# Patient Record
Sex: FEMALE | Race: BLACK OR AFRICAN AMERICAN | Employment: FULL TIME
[De-identification: names, ages, dates, MRNs, and addresses within clinical notes are randomized per-mention and may not be internally consistent; named-entity substitution may affect disease eponyms.]

---
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
## 2019-03-01 ENCOUNTER — HOSPITAL ENCOUNTER (OUTPATIENT)
Age: 19
Setting detail: SPECIMEN
Discharge: HOME | End: 2019-03-01
Payer: COMMERCIAL

## 2019-03-01 ENCOUNTER — OFFICE VISIT (OUTPATIENT)
Dept: FAMILY MEDICINE CLINIC | Age: 19
End: 2019-03-01
Payer: COMMERCIAL

## 2019-03-01 VITALS
DIASTOLIC BLOOD PRESSURE: 83 MMHG | WEIGHT: 145 LBS | SYSTOLIC BLOOD PRESSURE: 128 MMHG | BODY MASS INDEX: 25.69 KG/M2 | TEMPERATURE: 97.8 F | HEIGHT: 63 IN

## 2019-03-01 DIAGNOSIS — Z00.121 WELL ADOLESCENT VISIT WITH ABNORMAL FINDINGS: Primary | ICD-10-CM

## 2019-03-01 DIAGNOSIS — K59.04 CHRONIC IDIOPATHIC CONSTIPATION: ICD-10-CM

## 2019-03-01 DIAGNOSIS — E04.9 THYROID GOITER: ICD-10-CM

## 2019-03-01 DIAGNOSIS — Z13.31 POSITIVE DEPRESSION SCREENING: ICD-10-CM

## 2019-03-01 DIAGNOSIS — L70.0 ACNE VULGARIS: ICD-10-CM

## 2019-03-01 LAB
THYROXINE, FREE: 1.33 NG/DL (ref 0.93–1.7)
TSH SERPL DL<=0.05 MIU/L-ACNC: 1.12 MIU/L (ref 0.3–5)

## 2019-03-01 PROCEDURE — 99214 OFFICE O/P EST MOD 30 MIN: CPT | Performed by: PEDIATRICS

## 2019-03-01 PROCEDURE — G8431 POS CLIN DEPRES SCRN F/U DOC: HCPCS | Performed by: PEDIATRICS

## 2019-03-01 PROCEDURE — 99395 PREV VISIT EST AGE 18-39: CPT | Performed by: PEDIATRICS

## 2019-03-01 RX ORDER — FLUOXETINE 10 MG/1
10-20 CAPSULE ORAL DAILY
Qty: 60 CAPSULE | Refills: 0 | Status: SHIPPED | OUTPATIENT
Start: 2019-03-01 | End: 2019-03-31

## 2019-03-01 RX ORDER — TRETINOIN 0.25 MG/G
GEL TOPICAL
Qty: 45 G | Refills: 1 | Status: SHIPPED | OUTPATIENT
Start: 2019-03-01

## 2019-03-01 ASSESSMENT — ENCOUNTER SYMPTOMS
VOMITING: 0
EYES NEGATIVE: 1
RESPIRATORY NEGATIVE: 1
COUGH: 0
ABDOMINAL PAIN: 0
CONSTIPATION: 1
NAUSEA: 0
ROS SKIN COMMENTS: ACNE
ALLERGIC/IMMUNOLOGIC NEGATIVE: 1

## 2019-03-01 ASSESSMENT — PATIENT HEALTH QUESTIONNAIRE - PHQ9
2. FEELING DOWN, DEPRESSED OR HOPELESS: 3
SUM OF ALL RESPONSES TO PHQ9 QUESTIONS 1 & 2: 5
SUM OF ALL RESPONSES TO PHQ QUESTIONS 1-9: 5
SUM OF ALL RESPONSES TO PHQ QUESTIONS 1-9: 5
1. LITTLE INTEREST OR PLEASURE IN DOING THINGS: 2

## 2019-07-11 ENCOUNTER — HOSPITAL ENCOUNTER (EMERGENCY)
Age: 19
Discharge: HOME | End: 2019-07-11
Attending: EMERGENCY MEDICINE
Payer: COMMERCIAL

## 2019-07-11 VITALS
BODY MASS INDEX: 27.6 KG/M2 | SYSTOLIC BLOOD PRESSURE: 113 MMHG | HEIGHT: 62 IN | HEART RATE: 89 BPM | RESPIRATION RATE: 16 BRPM | OXYGEN SATURATION: 100 % | DIASTOLIC BLOOD PRESSURE: 79 MMHG | TEMPERATURE: 97.9 F | WEIGHT: 150 LBS

## 2019-07-11 DIAGNOSIS — K52.9 GASTROENTERITIS: Primary | ICD-10-CM

## 2019-07-11 PROCEDURE — 81025 URINE PREGNANCY TEST: CPT

## 2019-07-11 PROCEDURE — 6370000000 HC RX 637 (ALT 250 FOR IP): Performed by: EMERGENCY MEDICINE

## 2019-07-11 PROCEDURE — 99283 EMERGENCY DEPT VISIT LOW MDM: CPT

## 2019-07-11 PROCEDURE — 81003 URINALYSIS AUTO W/O SCOPE: CPT

## 2019-07-11 RX ORDER — ONDANSETRON 4 MG/1
4 TABLET, ORALLY DISINTEGRATING ORAL EVERY 6 HOURS PRN
Qty: 9 TABLET | Refills: 0 | Status: SHIPPED | OUTPATIENT
Start: 2019-07-11 | End: 2020-08-13

## 2019-07-11 RX ORDER — ONDANSETRON 4 MG/1
4 TABLET, ORALLY DISINTEGRATING ORAL ONCE
Status: COMPLETED | OUTPATIENT
Start: 2019-07-11 | End: 2019-07-11

## 2019-07-11 RX ORDER — DICYCLOMINE HYDROCHLORIDE 10 MG/1
10 CAPSULE ORAL EVERY 6 HOURS PRN
Qty: 9 CAPSULE | Refills: 0 | Status: SHIPPED | OUTPATIENT
Start: 2019-07-11

## 2019-07-11 RX ORDER — DICYCLOMINE HYDROCHLORIDE 10 MG/1
10 CAPSULE ORAL ONCE
Status: COMPLETED | OUTPATIENT
Start: 2019-07-11 | End: 2019-07-11

## 2019-07-11 RX ADMIN — ONDANSETRON 4 MG: 4 TABLET, ORALLY DISINTEGRATING ORAL at 14:25

## 2019-07-11 RX ADMIN — DICYCLOMINE HYDROCHLORIDE 10 MG: 10 CAPSULE ORAL at 14:25

## 2019-07-11 ASSESSMENT — PAIN DESCRIPTION - LOCATION: LOCATION: ABDOMEN

## 2019-07-11 ASSESSMENT — ENCOUNTER SYMPTOMS
BACK PAIN: 0
NAUSEA: 1
DIARRHEA: 1
VOMITING: 1
ABDOMINAL PAIN: 1
SHORTNESS OF BREATH: 0

## 2019-07-11 ASSESSMENT — PAIN DESCRIPTION - ORIENTATION: ORIENTATION: MID;UPPER

## 2019-07-11 ASSESSMENT — PAIN DESCRIPTION - DESCRIPTORS: DESCRIPTORS: CRAMPING

## 2019-07-11 ASSESSMENT — PAIN SCALES - GENERAL: PAINLEVEL_OUTOF10: 6

## 2019-07-11 ASSESSMENT — PAIN DESCRIPTION - PAIN TYPE: TYPE: ACUTE PAIN

## 2019-07-11 NOTE — ED PROVIDER NOTES
She does not appear to require any further emergent evaluation. Patient appears stable for discharge with supportive care at home with antiemetics, antidiarrheals as needed, Bentyl for abdominal cramping. Patient knows to return to the ED if symptoms worsen. Multiple diagnoses were considered during patient's evaluation. Pt doesn't appear to have any emergent process at this time that would require immediate surgical or medical inpatient treatment. Patient is safe for discharge with plan as above. CRITICAL CARE:     Procedures    DIAGNOSTIC RESULTS   EKG:All EKG's are interpreted by the Emergency Department Physician who either signs or Co-signs this chart in the absence of a cardiologist.      RADIOLOGY:All plain film, CT, MRI, and formal ultrasound images (except ED bedside ultrasound) are read by the radiologist, see reports below, unless otherwisenoted in MDM or here. No orders to display     LABS: All lab results were reviewed by myself, and all abnormals are listed below. Labs Reviewed - No data to display  EMERGENCY DEPARTMENTCOURSE:   Vitals:    Vitals:    07/11/19 1353   BP: 113/79   Pulse: 89   Resp: 16   Temp: 97.9 °F (36.6 °C)   TempSrc: Oral   SpO2: 100%   Weight: 150 lb (68 kg)   Height: 5' 2\" (1.575 m)       The patient was given the following medications while in the emergency department:  Orders Placed This Encounter   Medications    ondansetron (ZOFRAN-ODT) disintegrating tablet 4 mg    dicyclomine (BENTYL) capsule 10 mg    ondansetron (ZOFRAN ODT) 4 MG disintegrating tablet     Sig: Take 1 tablet by mouth every 6 hours as needed for Nausea or Vomiting     Dispense:  9 tablet     Refill:  0    dicyclomine (BENTYL) 10 MG capsule     Sig: Take 1 capsule by mouth every 6 hours as needed (cramps)     Dispense:  9 capsule     Refill:  0     CONSULTS:  None    FINAL IMPRESSION      1.  Gastroenteritis          DISPOSITION/PLAN   DISPOSITION Decision To Discharge 07/11/2019 02:13:27 PM      PATIENT REFERRED TO:  Roberto King MD  [address] [place] [address]  [phone]      If symptoms worsen    DISCHARGE MEDICATIONS:  New Prescriptions    DICYCLOMINE (BENTYL) 10 MG CAPSULE    Take 1 capsule by mouth every 6 hours as needed (cramps)    ONDANSETRON (ZOFRAN ODT) 4 MG DISINTEGRATING TABLET    Take 1 tablet by mouth every 6 hours as needed for Nausea or Vomiting     Joan Choi MD  Attending Emergency Physician  Lauren voice recognition software used in portions of this document.                     Joan Choi MD  07/11/19 4314

## 2019-07-11 NOTE — ED NOTES
Pt presents to ed c/o n/v/d since a couple days ago from eating at [employer]. MM pink and moist.  Abdomen soft and non-tender to palpation. Vital signs stable.       Zeynep Dominguez RN  07/11/19 3251

## 2019-07-12 LAB — HCG, PREGNANCY URINE (POC): NEGATIVE

## 2020-08-13 ENCOUNTER — HOSPITAL ENCOUNTER (EMERGENCY)
Age: 20
Discharge: HOME | End: 2020-08-13
Attending: STUDENT IN AN ORGANIZED HEALTH CARE EDUCATION/TRAINING PROGRAM
Payer: COMMERCIAL

## 2020-08-13 ENCOUNTER — APPOINTMENT (OUTPATIENT)
Dept: GENERAL RADIOLOGY | Age: 20
End: 2020-08-13
Payer: COMMERCIAL

## 2020-08-13 VITALS
WEIGHT: 155 LBS | HEIGHT: 62 IN | SYSTOLIC BLOOD PRESSURE: 134 MMHG | OXYGEN SATURATION: 98 % | HEART RATE: 91 BPM | RESPIRATION RATE: 18 BRPM | TEMPERATURE: 98.1 F | BODY MASS INDEX: 28.52 KG/M2 | DIASTOLIC BLOOD PRESSURE: 88 MMHG

## 2020-08-13 PROCEDURE — 99283 EMERGENCY DEPT VISIT LOW MDM: CPT

## 2020-08-13 PROCEDURE — 10060 I&D ABSCESS SIMPLE/SINGLE: CPT

## 2020-08-13 PROCEDURE — 73552 X-RAY EXAM OF FEMUR 2/>: CPT

## 2020-08-13 PROCEDURE — 6370000000 HC RX 637 (ALT 250 FOR IP): Performed by: STUDENT IN AN ORGANIZED HEALTH CARE EDUCATION/TRAINING PROGRAM

## 2020-08-13 RX ORDER — SULFAMETHOXAZOLE AND TRIMETHOPRIM 800; 160 MG/1; MG/1
1 TABLET ORAL EVERY 12 HOURS SCHEDULED
Status: DISCONTINUED | OUTPATIENT
Start: 2020-08-13 | End: 2020-08-13

## 2020-08-13 RX ORDER — CEPHALEXIN 250 MG/1
500 CAPSULE ORAL ONCE
Status: COMPLETED | OUTPATIENT
Start: 2020-08-13 | End: 2020-08-13

## 2020-08-13 RX ORDER — ONDANSETRON 4 MG/1
4 TABLET, ORALLY DISINTEGRATING ORAL ONCE
Status: COMPLETED | OUTPATIENT
Start: 2020-08-13 | End: 2020-08-13

## 2020-08-13 RX ORDER — HYDROCODONE BITARTRATE AND ACETAMINOPHEN 5; 325 MG/1; MG/1
1 TABLET ORAL ONCE
Status: COMPLETED | OUTPATIENT
Start: 2020-08-13 | End: 2020-08-13

## 2020-08-13 RX ORDER — CEPHALEXIN 250 MG/1
250 CAPSULE ORAL 4 TIMES DAILY
Qty: 40 CAPSULE | Refills: 0 | Status: ON HOLD | OUTPATIENT
Start: 2020-08-13 | End: 2020-08-31 | Stop reason: HOSPADM

## 2020-08-13 RX ORDER — FLUCONAZOLE 150 MG/1
150 TABLET ORAL ONCE
Qty: 1 TABLET | Refills: 0 | Status: SHIPPED | OUTPATIENT
Start: 2020-08-13 | End: 2020-08-13

## 2020-08-13 RX ORDER — SULFAMETHOXAZOLE AND TRIMETHOPRIM 800; 160 MG/1; MG/1
1 TABLET ORAL EVERY 12 HOURS SCHEDULED
Status: DISCONTINUED | OUTPATIENT
Start: 2020-08-13 | End: 2020-08-13 | Stop reason: HOSPADM

## 2020-08-13 RX ORDER — SULFAMETHOXAZOLE AND TRIMETHOPRIM 800; 160 MG/1; MG/1
1 TABLET ORAL 2 TIMES DAILY
Qty: 20 TABLET | Refills: 0 | Status: SHIPPED | OUTPATIENT
Start: 2020-08-13 | End: 2020-08-23

## 2020-08-13 RX ORDER — ONDANSETRON 4 MG/1
4 TABLET, ORALLY DISINTEGRATING ORAL EVERY 8 HOURS PRN
Qty: 5 TABLET | Refills: 0 | Status: SHIPPED | OUTPATIENT
Start: 2020-08-13 | End: 2022-09-19

## 2020-08-13 RX ADMIN — CEPHALEXIN 500 MG: 250 CAPSULE ORAL at 17:41

## 2020-08-13 RX ADMIN — HYDROCODONE BITARTRATE AND ACETAMINOPHEN 1 TABLET: 5; 325 TABLET ORAL at 17:41

## 2020-08-13 RX ADMIN — HYDROCODONE BITARTRATE AND ACETAMINOPHEN 1 TABLET: 5; 325 TABLET ORAL at 15:20

## 2020-08-13 RX ADMIN — ONDANSETRON 4 MG: 4 TABLET, ORALLY DISINTEGRATING ORAL at 15:03

## 2020-08-13 RX ADMIN — SULFAMETHOXAZOLE AND TRIMETHOPRIM 1 TABLET: 800; 160 TABLET ORAL at 17:41

## 2020-08-13 ASSESSMENT — ENCOUNTER SYMPTOMS
DIARRHEA: 0
EYE PAIN: 0
SORE THROAT: 0
ABDOMINAL PAIN: 0
CONSTIPATION: 0
SHORTNESS OF BREATH: 0
RHINORRHEA: 0
COUGH: 0

## 2020-08-13 ASSESSMENT — PAIN DESCRIPTION - ORIENTATION: ORIENTATION: LEFT

## 2020-08-13 ASSESSMENT — PAIN SCALES - GENERAL
PAINLEVEL_OUTOF10: 9

## 2020-08-13 ASSESSMENT — PAIN DESCRIPTION - LOCATION: LOCATION: LEG

## 2020-08-13 ASSESSMENT — PAIN DESCRIPTION - DESCRIPTORS: DESCRIPTORS: BURNING

## 2020-08-13 ASSESSMENT — PAIN DESCRIPTION - PROGRESSION: CLINICAL_PROGRESSION: NOT CHANGED

## 2020-08-13 ASSESSMENT — PAIN DESCRIPTION - PAIN TYPE: TYPE: ACUTE PAIN

## 2020-08-13 ASSESSMENT — PAIN DESCRIPTION - FREQUENCY: FREQUENCY: CONTINUOUS

## 2020-08-13 NOTE — ED NOTES
Pt medicated per order, denies needs or questions at this time      Tanner Jurist, RN  08/13/20 4307

## 2020-08-13 NOTE — ED PROVIDER NOTES
[facility] ED  Emergency Department Encounter  Emergency Medicine Resident     Pt Name: Xi Hensley  MRN: [id number]  [place] 2000  Date of evaluation: 8/13/20  PCP:  Martine Mcelroy MD    CHIEF COMPLAINT       Chief Complaint   Patient presents with    Abscess     Pt states abscess on Lt thigh going on for about x1 week. HISTORY OFPRESENT ILLNESS  (Location/Symptom, Timing/Onset, Context/Setting, Quality, Duration, Modifying Factors,Severity.)      Xi Hensley is a 20 y.o. female with no significant past medical history who presents with acute worsening of states that she noticed mid last week. Patient went to outside hospital on Monday and received clindamycin which she has been attempting to take but has not been able to keep down due to vomiting after taking the antibiotic. She states that she has been able to keep a few of them down. Patient noticed that the abscess was starting to drain blood on the 10th and noticed that the swelling was increasing and starting to spread up her anterior thigh. Patient states that the abscess and the surrounding swelling is tender a burning type pain. Patient states that this pain is 9 out of 10 in severity but 10 out of 10 when she walks. Walking, movement, and touching the area makes the pain worse. Pain is radiating up the anterior and medial thigh. Patient stating that she has felt lightheaded has had associated nausea, vomiting. PAST MEDICAL / SURGICAL / SOCIAL / FAMILY HISTORY      has no past medical history on file. has no past surgical history on file. Social:  reports that she is a non-smoker but has been exposed to tobacco smoke. She has never used smokeless tobacco. She reports that she does not drink alcohol or use drugs.     Family Hx:   Family History   Problem Relation Age of Onset    Asthma Brother     Heart Disease Maternal Grandmother     High Blood Pressure Maternal Grandmother     Cancer Maternal Grandfather     High Blood Pressure Maternal Grandfather     High Cholesterol Maternal Grandfather     Learning Disabilities Maternal Grandfather     Stroke Maternal Grandfather     Substance Abuse Maternal Grandfather     Learning Disabilities Maternal Aunt     Learning Disabilities Maternal Uncle     Substance Abuse Maternal Uncle     Arthritis Neg Hx     Birth Defects Neg Hx     Depression Neg Hx     Diabetes Neg Hx     Early Death Neg Hx     Hearing Loss Neg Hx     Kidney Disease Neg Hx     Mental Illness Neg Hx     Mental Retardation Neg Hx     Miscarriages / Stillbirths Neg Hx         Allergies:  Patient has no known allergies. Home Medications:  Prior to Admission medications    Medication Sig Start Date End Date Taking? Authorizing Provider   cephALEXin (KEFLEX) 250 MG capsule Take 1 capsule by mouth 4 times daily 8/13/20  Yes Lesli Vincent DO   sulfamethoxazole-trimethoprim (BACTRIM DS) 800-160 MG per tablet Take 1 tablet by mouth 2 times daily for 10 days 8/13/20 8/23/20 Yes Lesli Vincent DO   fluconazole (DIFLUCAN) 150 MG tablet Take 1 tablet by mouth once for 1 dose 8/13/20 8/13/20 Yes Lesli Vincent DO   ondansetron (ZOFRAN ODT) 4 MG disintegrating tablet Place 1 tablet under the tongue every 8 hours as needed for Nausea 8/13/20  Yes Lesli Vincent DO   dicyclomine (BENTYL) 10 MG capsule Take 1 capsule by mouth every 6 hours as needed (cramps) 7/11/19   Petra Lynch MD   FLUoxetine (PROZAC) 10 MG capsule Take 1-2 capsules by mouth daily 3/1/19 3/31/19  Alejandra Parra MD   tretinoin (RETIN-A) 0.025 % gel Apply topically nightly. 3/1/19   Alejandra Parra MD   benzoyl peroxide [facility] [address]) 5 % external liquid Apply topically 2 times daily.  2/16/18   Alejandra Parra MD   clindamycin (CLINDAGEL) 1 % gel Twice daily 2/16/18   Alejandra Parra MD   medroxyPROGESTERone (DEPO-PROVERA) 150 MG/ML injection Inject 1 mL into the muscle once for 1 dose 10/9/17 10/9/17  Jaquan Gee MD   ferrous sulfate (FE TABS) 325 (65 FE) MG EC tablet Take 1 tablet by mouth 2 times daily 1/20/17 2/19/17  Sherman Diehl MD       REVIEW OFSYSTEMS    (2-9 systems for level 4, 10 or more for level 5)      Review of Systems   Constitutional: Negative for activity change, chills and fever. HENT: Negative for congestion, rhinorrhea and sore throat. Eyes: Negative for pain and visual disturbance. Respiratory: Negative for cough and shortness of breath. Cardiovascular: Negative for chest pain and palpitations. Gastrointestinal: Negative for abdominal pain, constipation and diarrhea. Genitourinary: Negative for difficulty urinating and frequency. Musculoskeletal: Negative for arthralgias and myalgias. Skin: Negative for rash. Abcess of left mid thigh   Neurological: Positive for light-headedness. Negative for dizziness and headaches. PHYSICAL EXAM   (up to 7 for level 4, 8 or more forlevel 5)      INITIAL VITALS:   Vitals:    08/13/20 1416   BP: 134/88   Pulse: 91   Resp: 18   Temp:    SpO2: 98%        Physical Exam  Vitals signs and nursing note reviewed. Constitutional:       General: She is not in acute distress. Appearance: Normal appearance. She is not ill-appearing. HENT:      Head: Normocephalic and atraumatic. Nose: Nose normal.      Mouth/Throat:      Mouth: Mucous membranes are moist.      Pharynx: Oropharynx is clear. Eyes:      General:         Right eye: No discharge. Left eye: No discharge. Cardiovascular:      Rate and Rhythm: Normal rate and regular rhythm. Heart sounds: No murmur. No friction rub. No gallop. Pulmonary:      Effort: Pulmonary effort is normal. No respiratory distress. Breath sounds: Normal breath sounds. Abdominal:      General: There is no distension. Palpations: Abdomen is soft. Tenderness: There is no abdominal tenderness. Skin:     General: Skin is warm and dry. examination of vaginal area, perineum, and groin. [MA]   1506 No vaginal involvement noted on physical exam. After discussion of extensive involvement will order Xray to determine depth and extent. R/o necrotizing fasciitis    [MA]   65 Patient updated that we will hold off on antibiotics until imaging is obtained. Awaiting Xray. [MA]   1643 Negative xray. No gas noted, no bone involvement noted. XR FEMUR LEFT (MIN 2 VIEWS) [MA]   1717 Irrigation and drainage done bedside. Copious amount of purulence drained until patient could not tolerate. [MA]   1913 Discussed importance of full course of antibiotics. Other educated on return precautions. They are compliant and understanding of return precautions. Instructed to return to the emergency department for a wound check in 2 to 3 days. Discussed to expect increase in drainage of the area and that this is an expected result of the antibiotics. Discussed to stop taking Clindamycin and take the provided Keflex and Bactrim. Patient and mother compliant with plan for discharge and understanding of discharge instructions. [MA]      ED Course User Index  [MA] Cathleen Pepper DO          PROCEDURES:  Incision/Drainage    Date/Time: 8/13/2020 7:16 PM  Performed by: Cathleen Pepper DO  Authorized by: Demi Aparicio DO     Consent:     Consent obtained:  Verbal    Consent given by:  Patient    Risks discussed:  Bleeding, incomplete drainage and pain  Location:     Type:  Abscess    Location:  Lower extremity    Lower extremity location:  Leg    Leg location:  L upper leg  Anesthesia (see MAR for exact dosages):      Anesthesia method:  None  Procedure type:     Complexity:  Complex  Procedure details:     Needle aspiration: no      Wound management:  Irrigated with saline and extensive cleaning    Drainage:  Bloody and purulent    Drainage amount:  Copious    Wound treatment:  Wound left open    Packing materials:  None  Post-procedure details: Patient tolerance of procedure: Tolerated well, no immediate complications        CONSULTS:  None      FINAL IMPRESSION      1. Cellulitis and abscess of leg          DISPOSITION / PLAN     DISPOSITION      Charge to home with written prescription for Keflex, Bactrim, Zofran, and fluconazole. And mother at bedside are both compliant and understanding of plan for discharge home. Understanding to return for wound recheck in 2 to 3 days. Natividaderin Cherri!!! On behalf of the Emergency Department staff at [facility]. [facility] Emergency Department, I would like to thank you for giving Ayesha Lee the opportunity to address your health care needs and concerns. We hope that during your visit, our service was delivered in a professional and caring manner. Please keep Ayesha Lee in mind as we walk with you down the path to your own personal wellness. Please expect an automated phone call from [phone] so we can ask a few questions about your health and progress. Based on your answers, a clinician may call you back to offer help and instructions. If you notice any concerning symptoms please return to the ER immediately. These can include but are not limited to: fevers, chills, shortness of breath, vomiting, weakness of the extremities, changes in your mental status, numbness, pale extremities, or chest pain. SUMMARY OF YOUR VISIT      Imaging: 3 of left leg showing no acute abnormalities. Follow up: To the primary care provider, emergency department, or walk-in clinic for wound recheck in 2 to 3 days.       PATIENT REFERRED TO:  [facility] ED  [address] [facility] [address]  [phone]    For wound re-check in 2-3 days      DISCHARGE MEDICATIONS:  Discharge Medication List as of 8/13/2020  5:30 PM      START taking these medications    Details   cephALEXin (KEFLEX) 250 MG capsule Take 1 capsule by mouth 4 times daily, Disp-40 capsule,R-0Print sulfamethoxazole-trimethoprim (BACTRIM DS) 800-160 MG per tablet Take 1 tablet by mouth 2 times daily for 10 days, Disp-20 tablet,R-0Print      fluconazole (DIFLUCAN) 150 MG tablet Take 1 tablet by mouth once for 1 dose, Disp-1 tablet,R-0Print             Shane Ibanez DO  Emergency Medicine Resident    (Please note that portions of this note were completed with a voice recognition program.Efforts were made to edit the dictations but occasionally words are mis-transcribed.)       Shane Ibanez DO  Resident  08/13/20 2900

## 2020-08-13 NOTE — ED NOTES
Pt reports to the ED via triage with c/o abscess. Pt states for about a week she has an abcess on the Lt thigh that keeps getting bigger, pt states she was at [facility] and they gave her antibiotics which caused her to vomit so she stopped taking them, pt sattes she came in today due to the leg getting more swollen, PMS intact of Lt leg. Pt is A&Ox4, RR even and non labored.       Lacy Briones RN  08/13/20 3745

## 2020-08-13 NOTE — ED PROVIDER NOTES
[facility]     Emergency Department     Faculty Note/ Attestation      Pt Name: Jade Benavides                                       MRN: [id number]  [place] 2000  Date of evaluation: 8/13/2020  Patients PCP:    Fredy Mejia MD    Attestation  I performed a history and physical examination of the patient/ or directly observed  and discussed management with the resident. I reviewed the residents note and agree with the documented findings and plan of care. Any areas of disagreement are noted on the chart. I was personally present for the key portions of any procedures. I have documented in the chart those procedures where I was not present during the key portions. I have reviewed the emergency nurses triage note. I agree with the chief complaint, past medical history, past surgical history, allergies, medications, social and family history as documented unless otherwise noted below. For Physician Assistant/ Nurse Practitioner cases/documentation I have personally evaluated this patient and have completed at least one if not all key elements of the E/M (history, physical exam, and MDM). Additional findings are as noted. Initial Screens:             Vitals:    Vitals:    08/13/20 1404 08/13/20 1416   BP:  134/88   Pulse:  91   Resp:  18   Temp: 98.1 °F (36.7 °C)    TempSrc: Oral    SpO2:  98%   Weight:  155 lb (70.3 kg)   Height:  5' 2\" (1.575 m)       Chief Complaint      Chief Complaint   Patient presents with    Abscess     Pt states abscess on Lt thigh going on for about x1 week. height is 5' 2\" (1.575 m) and weight is 155 lb (70.3 kg). Her oral temperature is 98.1 °F (36.7 °C). Her blood pressure is 134/88 and her pulse is 91. Her respiration is 18 and oxygen saturation is 98%.             DIAGNOSTIC RESULTS       RADIOLOGY:   No orders to display         LABS:  Labs Reviewed - No data to display      EMERGENCY DEPARTMENT COURSE: -------------------------    BP: 134/88, Temp: 98.1 °F (36.7 °C), Pulse: 91, Resp: 18    System Problem List     There is no problem list on file for this patient.     Comments  Chronic Prob List noted    Abscess to LLE, seen at [facility] on Monday and given clindamycin however unable to keep it down  Swelling increased, up to anterior medial thigh  spontaneously draining mostly blood    On evaluation very large area of induration and tenderness to medial aspect of thigh  Large approx 4cm open area draining blood with small amount of purulence    Will xray, irrigate, plan to d/c w/ keflex, bactrim, close f/u    Kelly Yanez DO  Attending Emergency Physician      Kelly Yanez,   08/13/20 [place]  08/13/20 9489

## 2020-08-13 NOTE — ED NOTES
Report received from [facility]. This caregiver met patient, resting quietly, no new change in status.   Medicated for pain to left thigh  Pain 9/10  Siderails up x2  Covered with blankets  Mom at bedside         Lawrence Langley RN  08/13/20 4641

## 2020-08-28 ENCOUNTER — APPOINTMENT (OUTPATIENT)
Dept: GENERAL RADIOLOGY | Age: 20
DRG: 581 | End: 2020-08-28
Payer: COMMERCIAL

## 2020-08-28 ENCOUNTER — HOSPITAL ENCOUNTER (INPATIENT)
Age: 20
LOS: 3 days | Discharge: HOME | DRG: 581 | End: 2020-08-31
Attending: EMERGENCY MEDICINE | Admitting: INTERNAL MEDICINE
Payer: COMMERCIAL

## 2020-08-28 PROBLEM — F12.10 CANNABIS ABUSE WITHOUT COMPLICATION: Status: ACTIVE | Noted: 2020-08-28

## 2020-08-28 PROBLEM — L02.419 ABSCESS OF THIGH: Status: ACTIVE | Noted: 2020-08-28

## 2020-08-28 PROBLEM — D75.838 REACTIVE THROMBOCYTOSIS: Status: ACTIVE | Noted: 2020-08-28

## 2020-08-28 PROBLEM — R79.82 CRP ELEVATED: Status: ACTIVE | Noted: 2020-08-28

## 2020-08-28 PROBLEM — L03.119 RECURRENT CELLULITIS OF THIGH: Status: ACTIVE | Noted: 2020-08-28

## 2020-08-28 LAB
ABSOLUTE EOS #: 0.3 K/UL (ref 0–0.44)
ABSOLUTE IMMATURE GRANULOCYTE: 0.06 K/UL (ref 0–0.3)
ABSOLUTE LYMPH #: 1.98 K/UL (ref 1.2–5.2)
ABSOLUTE MONO #: 0.63 K/UL (ref 0.1–1.4)
ANION GAP SERPL CALCULATED.3IONS-SCNC: 12 MMOL/L (ref 9–17)
BASOPHILS # BLD: 1 % (ref 0–2)
BASOPHILS ABSOLUTE: 0.07 K/UL (ref 0–0.2)
BUN BLDV-MCNC: 10 MG/DL (ref 6–20)
BUN/CREAT BLD: ABNORMAL (ref 9–20)
C-REACTIVE PROTEIN: 35.4 MG/L (ref 0–5)
CALCIUM SERPL-MCNC: 9.2 MG/DL (ref 8.6–10.4)
CHLORIDE BLD-SCNC: 102 MMOL/L (ref 98–107)
CO2: 23 MMOL/L (ref 20–31)
CREAT SERPL-MCNC: 0.41 MG/DL (ref 0.5–0.9)
DIFFERENTIAL TYPE: ABNORMAL
EOSINOPHILS RELATIVE PERCENT: 3 % (ref 1–4)
GFR AFRICAN AMERICAN: >60 ML/MIN
GFR NON-AFRICAN AMERICAN: >60 ML/MIN
GFR SERPL CREATININE-BSD FRML MDRD: ABNORMAL ML/MIN/{1.73_M2}
GFR SERPL CREATININE-BSD FRML MDRD: ABNORMAL ML/MIN/{1.73_M2}
GLUCOSE BLD-MCNC: 95 MG/DL (ref 70–99)
HCG QUALITATIVE: NEGATIVE
HCT VFR BLD CALC: 37.2 % (ref 36.3–47.1)
HEMOGLOBIN: 12 G/DL (ref 11.9–15.1)
HEPATITIS C ANTIBODY: NONREACTIVE
HIV AG/AB: NONREACTIVE
IMMATURE GRANULOCYTES: 1 %
LYMPHOCYTES # BLD: 16 % (ref 25–45)
MCH RBC QN AUTO: 27.9 PG (ref 25.2–33.5)
MCHC RBC AUTO-ENTMCNC: 32.3 G/DL (ref 28.4–34.8)
MCV RBC AUTO: 86.5 FL (ref 82.6–102.9)
MONOCYTES # BLD: 5 % (ref 2–8)
NRBC AUTOMATED: 0 PER 100 WBC
PDW BLD-RTO: 15.6 % (ref 11.8–14.4)
PLATELET # BLD: 593 K/UL (ref 138–453)
PLATELET ESTIMATE: ABNORMAL
PMV BLD AUTO: 9.6 FL (ref 8.1–13.5)
POTASSIUM SERPL-SCNC: 4.5 MMOL/L (ref 3.7–5.3)
RBC # BLD: 4.3 M/UL (ref 3.95–5.11)
RBC # BLD: ABNORMAL 10*6/UL
SEG NEUTROPHILS: 74 % (ref 34–64)
SEGMENTED NEUTROPHILS ABSOLUTE COUNT: 9.13 K/UL (ref 1.8–8)
SODIUM BLD-SCNC: 137 MMOL/L (ref 135–144)
WBC # BLD: 12.2 K/UL (ref 4.5–13.5)
WBC # BLD: ABNORMAL 10*3/UL

## 2020-08-28 PROCEDURE — 6360000002 HC RX W HCPCS: Performed by: NURSE PRACTITIONER

## 2020-08-28 PROCEDURE — 90715 TDAP VACCINE 7 YRS/> IM: CPT | Performed by: EMERGENCY MEDICINE

## 2020-08-28 PROCEDURE — 87040 BLOOD CULTURE FOR BACTERIA: CPT

## 2020-08-28 PROCEDURE — 86803 HEPATITIS C AB TEST: CPT

## 2020-08-28 PROCEDURE — 84703 CHORIONIC GONADOTROPIN ASSAY: CPT

## 2020-08-28 PROCEDURE — 87186 SC STD MICRODIL/AGAR DIL: CPT

## 2020-08-28 PROCEDURE — 6360000002 HC RX W HCPCS: Performed by: INTERNAL MEDICINE

## 2020-08-28 PROCEDURE — 86140 C-REACTIVE PROTEIN: CPT

## 2020-08-28 PROCEDURE — 96367 TX/PROPH/DG ADDL SEQ IV INF: CPT

## 2020-08-28 PROCEDURE — 6360000002 HC RX W HCPCS: Performed by: EMERGENCY MEDICINE

## 2020-08-28 PROCEDURE — 87389 HIV-1 AG W/HIV-1&-2 AB AG IA: CPT

## 2020-08-28 PROCEDURE — 86403 PARTICLE AGGLUT ANTBDY SCRN: CPT

## 2020-08-28 PROCEDURE — 87205 SMEAR GRAM STAIN: CPT

## 2020-08-28 PROCEDURE — 1200000000 HC SEMI PRIVATE

## 2020-08-28 PROCEDURE — 6370000000 HC RX 637 (ALT 250 FOR IP): Performed by: EMERGENCY MEDICINE

## 2020-08-28 PROCEDURE — 0J9M0ZZ DRAINAGE OF LEFT UPPER LEG SUBCUTANEOUS TISSUE AND FASCIA, OPEN APPROACH: ICD-10-PCS | Performed by: EMERGENCY MEDICINE

## 2020-08-28 PROCEDURE — 2580000003 HC RX 258: Performed by: NURSE PRACTITIONER

## 2020-08-28 PROCEDURE — 90471 IMMUNIZATION ADMIN: CPT | Performed by: EMERGENCY MEDICINE

## 2020-08-28 PROCEDURE — 99223 1ST HOSP IP/OBS HIGH 75: CPT | Performed by: INTERNAL MEDICINE

## 2020-08-28 PROCEDURE — 80048 BASIC METABOLIC PNL TOTAL CA: CPT

## 2020-08-28 PROCEDURE — 73552 X-RAY EXAM OF FEMUR 2/>: CPT

## 2020-08-28 PROCEDURE — 96365 THER/PROPH/DIAG IV INF INIT: CPT

## 2020-08-28 PROCEDURE — 10061 I&D ABSCESS COMP/MULTIPLE: CPT

## 2020-08-28 PROCEDURE — 96372 THER/PROPH/DIAG INJ SC/IM: CPT

## 2020-08-28 PROCEDURE — 87075 CULTR BACTERIA EXCEPT BLOOD: CPT

## 2020-08-28 PROCEDURE — 6370000000 HC RX 637 (ALT 250 FOR IP): Performed by: INTERNAL MEDICINE

## 2020-08-28 PROCEDURE — 99284 EMERGENCY DEPT VISIT MOD MDM: CPT

## 2020-08-28 PROCEDURE — 85025 COMPLETE CBC W/AUTO DIFF WBC: CPT

## 2020-08-28 PROCEDURE — 87070 CULTURE OTHR SPECIMN AEROBIC: CPT

## 2020-08-28 PROCEDURE — 99254 IP/OBS CNSLTJ NEW/EST MOD 60: CPT | Performed by: INTERNAL MEDICINE

## 2020-08-28 PROCEDURE — 96375 TX/PRO/DX INJ NEW DRUG ADDON: CPT

## 2020-08-28 RX ORDER — DIPHENHYDRAMINE HYDROCHLORIDE 50 MG/ML
25 INJECTION INTRAMUSCULAR; INTRAVENOUS ONCE
Status: COMPLETED | OUTPATIENT
Start: 2020-08-28 | End: 2020-08-28

## 2020-08-28 RX ORDER — ACETAMINOPHEN 650 MG/1
650 SUPPOSITORY RECTAL EVERY 6 HOURS PRN
Status: DISCONTINUED | OUTPATIENT
Start: 2020-08-28 | End: 2020-08-28

## 2020-08-28 RX ORDER — POLYETHYLENE GLYCOL 3350 17 G/17G
17 POWDER, FOR SOLUTION ORAL DAILY PRN
Status: DISCONTINUED | OUTPATIENT
Start: 2020-08-28 | End: 2020-08-31 | Stop reason: HOSPADM

## 2020-08-28 RX ORDER — VANCOMYCIN HYDROCHLORIDE 1 G/200ML
15 INJECTION, SOLUTION INTRAVENOUS ONCE
Status: COMPLETED | OUTPATIENT
Start: 2020-08-28 | End: 2020-08-28

## 2020-08-28 RX ORDER — VANCOMYCIN HYDROCHLORIDE 1 G/200ML
1000 INJECTION, SOLUTION INTRAVENOUS EVERY 8 HOURS
Status: DISCONTINUED | OUTPATIENT
Start: 2020-08-28 | End: 2020-08-31 | Stop reason: HOSPADM

## 2020-08-28 RX ORDER — MORPHINE SULFATE 4 MG/ML
2 INJECTION, SOLUTION INTRAMUSCULAR; INTRAVENOUS ONCE
Status: COMPLETED | OUTPATIENT
Start: 2020-08-28 | End: 2020-08-28

## 2020-08-28 RX ORDER — KETOROLAC TROMETHAMINE 30 MG/ML
30 INJECTION, SOLUTION INTRAMUSCULAR; INTRAVENOUS ONCE
Status: COMPLETED | OUTPATIENT
Start: 2020-08-28 | End: 2020-08-28

## 2020-08-28 RX ORDER — SODIUM CHLORIDE 0.9 % (FLUSH) 0.9 %
10 SYRINGE (ML) INJECTION EVERY 12 HOURS SCHEDULED
Status: DISCONTINUED | OUTPATIENT
Start: 2020-08-28 | End: 2020-08-31 | Stop reason: HOSPADM

## 2020-08-28 RX ORDER — ACETAMINOPHEN 500 MG
1000 TABLET ORAL ONCE
Status: COMPLETED | OUTPATIENT
Start: 2020-08-28 | End: 2020-08-28

## 2020-08-28 RX ORDER — ONDANSETRON 2 MG/ML
4 INJECTION INTRAMUSCULAR; INTRAVENOUS EVERY 6 HOURS PRN
Status: DISCONTINUED | OUTPATIENT
Start: 2020-08-28 | End: 2020-08-31 | Stop reason: HOSPADM

## 2020-08-28 RX ORDER — LIDOCAINE 40 MG/G
CREAM TOPICAL ONCE
Status: COMPLETED | OUTPATIENT
Start: 2020-08-28 | End: 2020-08-28

## 2020-08-28 RX ORDER — SODIUM CHLORIDE 0.9 % (FLUSH) 0.9 %
10 SYRINGE (ML) INJECTION PRN
Status: DISCONTINUED | OUTPATIENT
Start: 2020-08-28 | End: 2020-08-31 | Stop reason: HOSPADM

## 2020-08-28 RX ORDER — NICOTINE 21 MG/24HR
1 PATCH, TRANSDERMAL 24 HOURS TRANSDERMAL DAILY PRN
Status: DISCONTINUED | OUTPATIENT
Start: 2020-08-28 | End: 2020-08-28

## 2020-08-28 RX ORDER — FENTANYL CITRATE 50 UG/ML
50 INJECTION, SOLUTION INTRAMUSCULAR; INTRAVENOUS ONCE
Status: COMPLETED | OUTPATIENT
Start: 2020-08-28 | End: 2020-08-28

## 2020-08-28 RX ORDER — DIPHENHYDRAMINE HYDROCHLORIDE 50 MG/ML
25 INJECTION INTRAMUSCULAR; INTRAVENOUS EVERY 6 HOURS PRN
Status: DISCONTINUED | OUTPATIENT
Start: 2020-08-28 | End: 2020-08-31 | Stop reason: HOSPADM

## 2020-08-28 RX ORDER — MAGNESIUM SULFATE 1 G/100ML
1 INJECTION INTRAVENOUS PRN
Status: DISCONTINUED | OUTPATIENT
Start: 2020-08-28 | End: 2020-08-31 | Stop reason: HOSPADM

## 2020-08-28 RX ORDER — ACETAMINOPHEN 325 MG/1
650 TABLET ORAL EVERY 6 HOURS PRN
Status: DISCONTINUED | OUTPATIENT
Start: 2020-08-28 | End: 2020-08-31 | Stop reason: HOSPADM

## 2020-08-28 RX ORDER — OXYCODONE HYDROCHLORIDE AND ACETAMINOPHEN 5; 325 MG/1; MG/1
1 TABLET ORAL EVERY 4 HOURS PRN
Status: DISCONTINUED | OUTPATIENT
Start: 2020-08-28 | End: 2020-08-31 | Stop reason: HOSPADM

## 2020-08-28 RX ORDER — PROMETHAZINE HYDROCHLORIDE 25 MG/1
12.5 TABLET ORAL EVERY 6 HOURS PRN
Status: DISCONTINUED | OUTPATIENT
Start: 2020-08-28 | End: 2020-08-31 | Stop reason: HOSPADM

## 2020-08-28 RX ORDER — POTASSIUM CHLORIDE 7.45 MG/ML
10 INJECTION INTRAVENOUS PRN
Status: DISCONTINUED | OUTPATIENT
Start: 2020-08-28 | End: 2020-08-31 | Stop reason: HOSPADM

## 2020-08-28 RX ORDER — SODIUM CHLORIDE 9 MG/ML
INJECTION, SOLUTION INTRAVENOUS CONTINUOUS
Status: DISCONTINUED | OUTPATIENT
Start: 2020-08-28 | End: 2020-08-31 | Stop reason: HOSPADM

## 2020-08-28 RX ORDER — KETOROLAC TROMETHAMINE 30 MG/ML
30 INJECTION, SOLUTION INTRAMUSCULAR; INTRAVENOUS EVERY 6 HOURS PRN
Status: DISCONTINUED | OUTPATIENT
Start: 2020-08-28 | End: 2020-08-29

## 2020-08-28 RX ORDER — POTASSIUM CHLORIDE 20 MEQ/1
40 TABLET, EXTENDED RELEASE ORAL PRN
Status: DISCONTINUED | OUTPATIENT
Start: 2020-08-28 | End: 2020-08-31 | Stop reason: HOSPADM

## 2020-08-28 RX ADMIN — KETOROLAC TROMETHAMINE 30 MG: 30 INJECTION, SOLUTION INTRAMUSCULAR at 09:28

## 2020-08-28 RX ADMIN — DIPHENHYDRAMINE HYDROCHLORIDE 25 MG: 50 INJECTION, SOLUTION INTRAMUSCULAR; INTRAVENOUS at 21:36

## 2020-08-28 RX ADMIN — VANCOMYCIN HYDROCHLORIDE 1000 MG: 1 INJECTION, SOLUTION INTRAVENOUS at 17:48

## 2020-08-28 RX ADMIN — FENTANYL CITRATE 50 MCG: 50 INJECTION, SOLUTION INTRAMUSCULAR; INTRAVENOUS at 08:28

## 2020-08-28 RX ADMIN — OXYCODONE HYDROCHLORIDE AND ACETAMINOPHEN 1 TABLET: 5; 325 TABLET ORAL at 16:08

## 2020-08-28 RX ADMIN — OXYCODONE HYDROCHLORIDE AND ACETAMINOPHEN 1 TABLET: 5; 325 TABLET ORAL at 21:37

## 2020-08-28 RX ADMIN — TETANUS TOXOID, REDUCED DIPHTHERIA TOXOID AND ACELLULAR PERTUSSIS VACCINE, ADSORBED 0.5 ML: 5; 2.5; 8; 8; 2.5 SUSPENSION INTRAMUSCULAR at 07:57

## 2020-08-28 RX ADMIN — ENOXAPARIN SODIUM 40 MG: 40 INJECTION SUBCUTANEOUS at 14:58

## 2020-08-28 RX ADMIN — MORPHINE SULFATE 2 MG: 4 INJECTION INTRAVENOUS at 07:57

## 2020-08-28 RX ADMIN — VANCOMYCIN HYDROCHLORIDE 1000 MG: 1 INJECTION, SOLUTION INTRAVENOUS at 09:34

## 2020-08-28 RX ADMIN — DIPHENHYDRAMINE HYDROCHLORIDE 25 MG: 50 INJECTION, SOLUTION INTRAMUSCULAR; INTRAVENOUS at 10:36

## 2020-08-28 RX ADMIN — LIDOCAINE: 40 CREAM TOPICAL at 08:01

## 2020-08-28 RX ADMIN — ACETAMINOPHEN 1000 MG: 500 TABLET ORAL at 07:38

## 2020-08-28 RX ADMIN — AMPICILLIN SODIUM AND SULBACTAM SODIUM 1.5 G: 1; .5 INJECTION, POWDER, FOR SOLUTION INTRAMUSCULAR; INTRAVENOUS at 16:02

## 2020-08-28 RX ADMIN — AMPICILLIN SODIUM AND SULBACTAM SODIUM 1.5 G: 1; .5 INJECTION, POWDER, FOR SOLUTION INTRAMUSCULAR; INTRAVENOUS at 22:14

## 2020-08-28 ASSESSMENT — ENCOUNTER SYMPTOMS
COLOR CHANGE: 0
EYE DISCHARGE: 0
WHEEZING: 0
CHOKING: 0
EYE ITCHING: 0
SINUS PRESSURE: 0
CONSTIPATION: 0
NAUSEA: 0
SORE THROAT: 0
APNEA: 0
COUGH: 0
VOMITING: 1
CHEST TIGHTNESS: 0
ABDOMINAL DISTENTION: 0
DIARRHEA: 0
FACIAL SWELLING: 0
ANAL BLEEDING: 0
PHOTOPHOBIA: 0
BLOOD IN STOOL: 0
STRIDOR: 0
SHORTNESS OF BREATH: 0
EYE PAIN: 0
ABDOMINAL PAIN: 0
VOMITING: 0
TROUBLE SWALLOWING: 0
EYE REDNESS: 0
SINUS PAIN: 0
RHINORRHEA: 0
NAUSEA: 1
VOICE CHANGE: 0
RECTAL PAIN: 0
BACK PAIN: 0

## 2020-08-28 ASSESSMENT — PAIN SCALES - GENERAL
PAINLEVEL_OUTOF10: 10
PAINLEVEL_OUTOF10: 6
PAINLEVEL_OUTOF10: 8
PAINLEVEL_OUTOF10: 7
PAINLEVEL_OUTOF10: 4
PAINLEVEL_OUTOF10: 10
PAINLEVEL_OUTOF10: 7

## 2020-08-28 ASSESSMENT — PAIN DESCRIPTION - ORIENTATION: ORIENTATION: LEFT

## 2020-08-28 ASSESSMENT — PAIN DESCRIPTION - DESCRIPTORS: DESCRIPTORS: STABBING;THROBBING;BURNING

## 2020-08-28 ASSESSMENT — PAIN DESCRIPTION - LOCATION: LOCATION: LEG

## 2020-08-28 NOTE — ED PROVIDER NOTES
[facility] ONC/MED SURG  Emergency Department Encounter  EmergencyMedicine Resident     Pt Shakir Boudreaux  MRN: [id number]  William 2000  Date of evaluation: 8/28/20  PCP:  Huey Mckeon MD    [address] [place] [address]       Chief Complaint   Patient presents with    Abscess     Left thigh       HISTORY OF PRESENT ILLNESS  (Location/Symptom, Timing/Onset, Context/Setting, Quality, Duration, Modifying Factors, Severity.)      Ridge Delaney is a 20 y.o. female who presents to the emergency department with a 1 month history of worsening induration, erythema, and pain to the left anterior thigh. Patient first noticed a hematoma over the left anterior thigh earlier this month and stated that it grew and ulcerated. She saw the emergency department and was initially treated with clindamycin for which she had intermittent compliance due to nausea and vomiting. She was evaluated in the emergency department earlier this month and given a prescription for Bactrim and Zofran which she attempted to be compliant with but stated that that also made her nauseous and vomit. As a result she has only had intermittent compliance with her antibiotics for the past month. She denies fever, chills, chest pain, shortness of breath, nausea, vomiting, or other systemic symptoms and states that the pain is localized only to the thigh with no tracking up the abdomen or into the groin area. No prior history of bad infections or IV drug use. PAST MEDICAL / SURGICAL / SOCIAL / FAMILY HISTORY      has no past medical history on file. has no past surgical history on file.     Social History     Socioeconomic History    Marital status: Single     Spouse name: Not on file    Number of children: Not on file    Years of education: Not on file    Highest education level: Not on file   Occupational History    Not on file   Social Needs    Financial resource strain: Not on file    Food insecurity     Worry: Not on file     Inability: Not on file    Transportation needs     Medical: Not on file     Non-medical: Not on file   Tobacco Use    Smoking status: Passive Smoke Exposure - Never Smoker    Smokeless tobacco: Never Used   Substance and Sexual Activity    Alcohol use: No    Drug use: No    Sexual activity: Yes     Partners: Male   Lifestyle    Physical activity     Days per week: Not on file     Minutes per session: Not on file    Stress: Not on file   Relationships    Social connections     Talks on phone: Not on file     Gets together: Not on file     Attends Mosque service: Not on file     Active member of club or organization: Not on file     Attends meetings of clubs or organizations: Not on file     Relationship status: Not on file    Intimate partner violence     Fear of current or ex partner: Not on file     Emotionally abused: Not on file     Physically abused: Not on file     Forced sexual activity: Not on file   Other Topics Concern    Not on file   Social History Narrative    Not on file       Family History   Problem Relation Age of Onset    Asthma Brother     Heart Disease Maternal Grandmother     High Blood Pressure Maternal Grandmother     Cancer Maternal Grandfather     High Blood Pressure Maternal Grandfather     High Cholesterol Maternal Grandfather     Learning Disabilities Maternal Grandfather     Stroke Maternal Grandfather     Substance Abuse Maternal Grandfather     Learning Disabilities Maternal Aunt     Learning Disabilities Maternal Uncle     Substance Abuse Maternal Uncle     Arthritis Neg Hx     Birth Defects Neg Hx     Depression Neg Hx     Diabetes Neg Hx     Early Death Neg Hx     Hearing Loss Neg Hx     Kidney Disease Neg Hx     Mental Illness Neg Hx     Mental Retardation Neg Hx     Miscarriages / Stillbirths Neg Hx        Allergies:  Patient has no known allergies. Home Medications:  Prior to Admission medications    Medication Sig Start Date End Date Taking? Authorizing Provider   cephALEXin (KEFLEX) 250 MG capsule Take 1 capsule by mouth 4 times daily 8/13/20   Adam Warren,    ondansetron (ZOFRAN ODT) 4 MG disintegrating tablet Place 1 tablet under the tongue every 8 hours as needed for Nausea 8/13/20   Eda Mcdowell DO   dicyclomine (BENTYL) 10 MG capsule Take 1 capsule by mouth every 6 hours as needed (cramps) 7/11/19   Miladys Nguyen MD   FLUoxetine (PROZAC) 10 MG capsule Take 1-2 capsules by mouth daily 3/1/19 3/31/19  Huey Mckeon MD   tretinoin (RETIN-A) 0.025 % gel Apply topically nightly. 3/1/19   Huey Mckeon MD   benzoyl peroxide [facility] [address]) 5 % external liquid Apply topically 2 times daily. 2/16/18   Huey Mckeon MD   clindamycin (CLINDAGEL) 1 % gel Twice daily 2/16/18   Huey Mckeon MD   medroxyPROGESTERone (DEPO-PROVERA) 150 MG/ML injection Inject 1 mL into the muscle once for 1 dose 10/9/17 10/9/17  Irving Martinez MD   ferrous sulfate (FE TABS) 325 (65 FE) MG EC tablet Take 1 tablet by mouth 2 times daily 1/20/17 2/19/17  Huey Mckeon MD       REVIEW OF SYSTEMS    (2-9 systems for level 4, 10 or more for level 5)      Review of Systems   Constitutional: Negative for chills and fever. HENT: Negative for ear pain, hearing loss and sore throat. Eyes: Negative for visual disturbance. Respiratory: Negative for shortness of breath. Cardiovascular: Negative for chest pain. Gastrointestinal: Positive for nausea and vomiting. Negative for abdominal pain, constipation and diarrhea. Genitourinary: Negative for difficulty urinating and dysuria. Musculoskeletal: Negative for arthralgias and myalgias. Skin: Positive for wound. Neurological: Negative for numbness. Psychiatric/Behavioral: Negative for agitation and confusion.        PHYSICAL EXAM   (up to 7 for level 4, 8 or more for level 5)      INITIAL VITALS:   /76   Pulse 69   Temp 98.7 °F (37.1 °C) (Oral)   Resp 14   Ht 5' 3\" (1.6 m) Wt 150 lb (68 kg)   LMP 07/15/2020   SpO2 100%   BMI 26.57 kg/m²     Physical Exam  Vitals signs and nursing note reviewed. Constitutional:       General: She is not in acute distress. Appearance: Normal appearance. She is well-developed. She is not ill-appearing or diaphoretic. HENT:      Head: Normocephalic and atraumatic. Right Ear: External ear normal.      Left Ear: External ear normal.      Nose: Nose normal.      Mouth/Throat:      Mouth: Mucous membranes are moist.   Eyes:      Extraocular Movements: Extraocular movements intact. Conjunctiva/sclera: Conjunctivae normal.   Neck:      Musculoskeletal: Normal range of motion and neck supple. Trachea: No tracheal deviation. Cardiovascular:      Rate and Rhythm: Normal rate and regular rhythm. Pulmonary:      Effort: Pulmonary effort is normal. No respiratory distress. Abdominal:      General: There is no distension. Palpations: Abdomen is soft. Musculoskeletal: Normal range of motion. General: No swelling, deformity or signs of injury. Skin:     General: Skin is warm and dry. Capillary Refill: Capillary refill takes less than 2 seconds. Coloration: Skin is not jaundiced. Findings: No bruising or lesion. Comments: A 2.5 cm football shaped area of fluctuance is present over the left anterior thigh with surrounding induration. Skin tone makes it difficult to evaluate for erythema. There is a 1 cm ulcer with moist bed present. Neurological:      General: No focal deficit present. Mental Status: She is alert and oriented to person, place, and time. Mental status is at baseline. Motor: No abnormal muscle tone.          DIFFERENTIAL  DIAGNOSIS     PLAN (LABS / IMAGING / EKG):  Orders Placed This Encounter   Procedures    INCISION AND DRAINAGE    Culture, Anaerobic and Aerobic    Culture, Blood 1    Culture, Blood 2    Culture, Anaerobic and Aerobic    XR FEMUR LEFT (MIN 2 VIEWS)    CBC Auto Differential    Basic Metabolic Panel w/ Reflex to MG    HCG Qualitative, Serum    Basic Metabolic Panel w/ Reflex to MG    CBC    Protime-INR    C-Reactive Protein    Urine Drug Screen, Comprehensive    Urinalysis Reflex to Culture    HIV Screen    Hepatitis C Antibody    DIET GENERAL;    Vital signs per unit routine    Notify physician    Up with assistance    Daily weights    Intake and output    Place intermittent pneumatic compression device    Full Code    Inpatient consult to Infectious Diseases    Inpatient consult to Internal Medicine    Pharmacy to Dose: Vancomycin    Inpatient consult to Infectious Diseases    Initiate Oxygen Therapy Protocol    Pulse Oximetry Spot Check    POCT urine pregnancy    Insert peripheral IV    PATIENT STATUS (FROM ED OR OR/PROCEDURAL) Inpatient    PATIENT STATUS (FROM ED OR OR/PROCEDURAL) Inpatient       MEDICATIONS ORDERED:  Orders Placed This Encounter   Medications    acetaminophen (TYLENOL) tablet 1,000 mg    Tetanus-Diphth-Acell Pertussis (BOOSTRIX) injection 0.5 mL    lidocaine (LMX) 4 % cream    morphine injection 2 mg    fentaNYL (SUBLIMAZE) injection 50 mcg    vancomycin (VANCOCIN) 1000 mg in dextrose 5% 200 mL IVPB    ketorolac (TORADOL) injection 30 mg    sodium chloride flush 0.9 % injection 10 mL    sodium chloride flush 0.9 % injection 10 mL    OR Linked Order Group     potassium chloride (KLOR-CON M) extended release tablet 40 mEq     potassium bicarb-citric acid (EFFER-K) effervescent tablet 40 mEq     potassium chloride 10 mEq/100 mL IVPB (Peripheral Line)    magnesium sulfate 1 g in dextrose 5% 100 mL IVPB    acetaminophen (TYLENOL) tablet 650 mg    DISCONTD: acetaminophen (TYLENOL) suppository 650 mg    polyethylene glycol (GLYCOLAX) packet 17 g    OR Linked Order Group     promethazine (PHENERGAN) tablet 12.5 mg     ondansetron (ZOFRAN) injection 4 mg    DISCONTD: nicotine (NICODERM CQ) 21 MG/24HR 1 14.4 %    Platelets 291 (H) 256 - 453 k/uL    MPV 9.6 8.1 - 13.5 fL    NRBC Automated 0.0 0.0 per 100 WBC    Differential Type NOT REPORTED     Seg Neutrophils 74 (H) 34 - 64 %    Lymphocytes 16 (L) 25 - 45 %    Monocytes 5 2 - 8 %    Eosinophils % 3 1 - 4 %    Basophils 1 0 - 2 %    Immature Granulocytes 1 (H) 0 %    Segs Absolute 9.13 (H) 1.80 - 8.00 k/uL    Absolute Lymph # 1.98 1.20 - 5.20 k/uL    Absolute Mono # 0.63 0.10 - 1.40 k/uL    Absolute Eos # 0.30 0.00 - 0.44 k/uL    Basophils Absolute 0.07 0.00 - 0.20 k/uL    Absolute Immature Granulocyte 0.06 0.00 - 0.30 k/uL    WBC Morphology NOT REPORTED     RBC Morphology ANISOCYTOSIS PRESENT     Platelet Estimate NOT REPORTED    Basic Metabolic Panel w/ Reflex to MG   Result Value Ref Range    Glucose 95 70 - 99 mg/dL    BUN 10 6 - 20 mg/dL    CREATININE 0.41 (L) 0.50 - 0.90 mg/dL    Bun/Cre Ratio NOT REPORTED 9 - 20    Calcium 9.2 8.6 - 10.4 mg/dL    Sodium 137 135 - 144 mmol/L    Potassium 4.5 3.7 - 5.3 mmol/L    Chloride 102 98 - 107 mmol/L    CO2 23 20 - 31 mmol/L    Anion Gap 12 9 - 17 mmol/L    GFR Non-African American >60 >60 mL/min    GFR African American >60 >60 mL/min    GFR Comment          GFR Staging NOT REPORTED    HCG Qualitative, Serum   Result Value Ref Range    hCG Qual NEGATIVE NEGATIVE   C-Reactive Protein   Result Value Ref Range    CRP 35.4 (H) 0.0 - 5.0 mg/L   HIV Screen   Result Value Ref Range    HIV Ag/Ab NONREACTIVE NONREACTIVE   Hepatitis C Antibody   Result Value Ref Range    Hepatitis C Ab NONREACTIVE NONREACTIVE       IMPRESSION: Fredrick Porter is a 20 y.o. female who presents to the emergency department with left anterior thigh wound. On examination she is afebrile, vital signs unremarkable and examination demonstrates an otherwise well-appearing female of stated age with a left anterior thigh abscess with cellulitic changes surrounding.   Given intermittent compliance with outpatient therapy and failure of outpatient therapy plan for [TS]   0207 Pt signed out to Dr. Latisha Monahan. [TS]      ED Course User Index  [TS] Debbie Thacker MD         CONSULTS:  IP CONSULT TO INFECTIOUS DISEASES  IP CONSULT TO INTERNAL MEDICINE  PHARMACY TO DOSE VANCOMYCIN  IP CONSULT TO INFECTIOUS DISEASES    CRITICAL CARE:  Please see attending note. FINAL IMPRESSION      1. Cellulitis and abscess of leg          DISPOSITION / PLAN     DISPOSITION        PATIENT REFERRED TO:  Lin Conde MD  [address] [place] [address]  [phone]            DISCHARGE MEDICATIONS:  Current Discharge Medication List          Debbie Thacker MD  Emergency Medicine Resident    This patient was evaluated in the Emergency Department for symptoms described in the history of present illness. He/she was evaluated in the context of the global COVID-19 pandemic, which necessitated consideration that the patient might be at risk for infection with the SARS-CoV-2 virus that causes COVID-19. Institutional protocols and algorithms that pertain to the evaluation of patients at risk for COVID-19 are in a state of rapid change based on information released by regulatory bodies including the CDC and federal and state organizations. These policies and algorithms were followed during the patient's care in the ED.     (Please note that portions of thisnote were completed with a voice recognition program.  Efforts were made to edit the dictations but occasionally words are mis-transcribed.)        Debbie Thacker MD  Resident  08/28/20 [facility] MD Dangelo  Resident  08/29/20 3656

## 2020-08-28 NOTE — ED NOTES
Pt self ambulated to bathroom with a slight limp on the left side due to abscess and ulcer. Pt is steady and has no current complaints. Pt is alert and oriented x4 with no signs of acute distress.       Reesa Buerger, RN  08/28/20 3711

## 2020-08-28 NOTE — ED PROVIDER NOTES
[facility]  Emergency Department  Emergency Medicine Resident Sign-out     Care of Kurt Hebert was assumed from Dr. Marvin Quiroz and is being seen for Abscess (Left thigh)  . The patient's initial evaluation and plan have been discussed with the prior provider who initially evaluated the patient.      EMERGENCY DEPARTMENT COURSE / MEDICAL DECISION MAKING:       MEDICATIONS GIVEN:  Orders Placed This Encounter   Medications    acetaminophen (TYLENOL) tablet 1,000 mg    Tetanus-Diphth-Acell Pertussis (BOOSTRIX) injection 0.5 mL    lidocaine (LMX) 4 % cream    morphine injection 2 mg    fentaNYL (SUBLIMAZE) injection 50 mcg    vancomycin (VANCOCIN) 1000 mg in dextrose 5% 200 mL IVPB    ketorolac (TORADOL) injection 30 mg    sodium chloride flush 0.9 % injection 10 mL    sodium chloride flush 0.9 % injection 10 mL    OR Linked Order Group     potassium chloride (KLOR-CON M) extended release tablet 40 mEq     potassium bicarb-citric acid (EFFER-K) effervescent tablet 40 mEq     potassium chloride 10 mEq/100 mL IVPB (Peripheral Line)    magnesium sulfate 1 g in dextrose 5% 100 mL IVPB    acetaminophen (TYLENOL) tablet 650 mg    DISCONTD: acetaminophen (TYLENOL) suppository 650 mg    polyethylene glycol (GLYCOLAX) packet 17 g    OR Linked Order Group     promethazine (PHENERGAN) tablet 12.5 mg     ondansetron (ZOFRAN) injection 4 mg    nicotine (NICODERM CQ) 21 MG/24HR 1 patch    enoxaparin (LOVENOX) injection 40 mg    ketorolac (TORADOL) injection 30 mg    ampicillin-sulbactam (UNASYN) 1.5 g IVPB minibag    diphenhydrAMINE (BENADRYL) injection 25 mg    oxyCODONE-acetaminophen (PERCOCET) 5-325 MG per tablet 1 tablet    vancomycin (VANCOCIN) intermittent dosing (placeholder)    vancomycin (VANCOCIN) 1000 mg in dextrose 5% 200 mL IVPB       LABS / RADIOLOGY:     Labs Reviewed   CULTURE, ANAEROBIC AND AEROBIC - Abnormal; Notable for the following components:       Result Value Direct Exam FEW NEUTROPHILS (*)     Direct Exam FEW GRAM POSITIVE COCCI IN CLUSTERS (*)     All other components within normal limits   CBC WITH AUTO DIFFERENTIAL - Abnormal; Notable for the following components:    RDW 15.6 (*)     Platelets 498 (*)     Seg Neutrophils 74 (*)     Lymphocytes 16 (*)     Immature Granulocytes 1 (*)     Segs Absolute 9.13 (*)     All other components within normal limits   BASIC METABOLIC PANEL W/ REFLEX TO MG FOR LOW K - Abnormal; Notable for the following components:    CREATININE 0.41 (*)     All other components within normal limits   C-REACTIVE PROTEIN - Abnormal; Notable for the following components:    CRP 35.4 (*)     All other components within normal limits   CULTURE, BLOOD 1   CULTURE, BLOOD 2   CULTURE, ANAEROBIC AND AEROBIC   HCG, SERUM, QUALITATIVE   HIV SCREEN   HEPATITIS C ANTIBODY   URINE DRUG SCREEN, COMPREHENSIVE   URINE RT REFLEX TO CULTURE       Xr Femur Left (min 2 Views)    Result Date: 8/28/2020  EXAMINATION: XRAY VIEWS OF THE LEFT FEMUR 8/28/2020 7:40 am COMPARISON: None. HISTORY: ORDERING SYSTEM PROVIDED HISTORY: Overlying cellulitis/abscess, please eval for subcutaneous air TECHNOLOGIST PROVIDED HISTORY: Overlying cellulitis/abscess, please eval for subcutaneous air Reason for Exam: overlying cellulitis/abscess, please eval for subcutaneous air Acuity: Unknown FINDINGS: Normal alignment at the hip and knee. Normal mineralization of the femur. No acute bony abnormality. Soft tissues show no radiopaque foreign body. About 17 cm above the knee there is small lucency in the subcutaneous fat along the inner aspect of the thigh. This could be artifact or soft tissue gas. 1. No acute osseous abnormality. 2. Small subcutaneous fat lucency along the inner aspect of the left thigh about 17 cm above knee. This could be soft tissue gas or artifact.      Xr Femur Left (min 2 Views)    Result Date: 8/13/2020  EXAMINATION: 4 XRAY VIEWS OF THE LEFT FEMUR 8/13/2020 4:16 pm COMPARISON: None. HISTORY: ORDERING SYSTEM PROVIDED HISTORY: diffuse drainage from complicated abcess. wanting to evaluate for necrosis, gas, abcess, bony involvement TECHNOLOGIST PROVIDED HISTORY: diffuse drainage from complicated abcess. wanting to evaluate for necrosis, gas, abcess, bony involvement Reason for Exam: open wound to medial aspect distal femur x 1 wk, no injury Acuity: Acute Type of Exam: Unknown FINDINGS: There is no evidence of acute fracture. There is normal alignment. No acute joint abnormality. No focal osseous lesion. No focal soft tissue abnormality. No soft tissue gas identified. No acute osseous abnormality. RECENT VITALS:     Temp: 98.8 °F (37.1 °C),  Pulse: 77, Resp: 16, BP: 109/65, SpO2: 100 %    ED Course as of Aug 28 1708   Fri Aug 28, 2020   2870 Abscess drained at bedside.    [TS]   [facility] to clarify need for IV antibiotics for more than 24h before they agreed to admit. [TS]   0206 Spoke to infectious disease who agreed that patient should be admitted to the medicine service, anticipate greater than 24-hour hospital stay. [facility] re-paged. Infectious disease will have resident come evaluate. [TS]   1 Spoke to infectious disease resident who will come to bedside.    [TS]   1030 On reevaluation there is a small area on the medial surface which may be developing abscess but no drainable fluid collection on palpation. Patient also does not wish to be incised again. She does request something for itching and is scratching her forehead. Will provide Benadryl.    [TS]   4498 Pt signed out to Dr. Rome Beasley. [TS]      ED Course User Index  [TS] Johney Scheuermann, MD       This patient is a 20 y.o. Female with cellulitis and abscess. Patient reportedly failed outpatient antibiotics of clindamycin as well as Bactrim. Patient received vancomycin. I&D performed at bedside. OUTSTANDING TASKS / RECOMMENDATIONS:    1.  Admit

## 2020-08-28 NOTE — ED NOTES
Pt to ED room 20 with c/o pain due to abscess on left thigh. Pt reports the abscess first appeared around August 7th but has gradually been growing. 2 abnormalities noted on let upper leg, one an open ulcer one a closed abscess. Pt is alert and oriented x4 with no signs of acute distress. Pt reports pain 10/10 and warmth to the area.         Eddy Laureano RN  08/28/20 [id number]

## 2020-08-28 NOTE — ED PROVIDER NOTES
medications. On exam she is in moderate to severe distress, pain score 10/10, vital signs however are normal.  There is pain with movement of the thigh. There is a large area of skin soft tissue infection of the left thigh. An area approximately 10 x 12 cm is indurated erythematous and markedly tender. Central to this is a very superficial cutaneous abscess with approximately 3 x 2 cm area of fluctuance. Distal to this is another area of an open ulceration without active drainage approximately 2 cm diameter. There is tenderness surrounding both of these areas deep into the thigh. Impression is refractory skin and soft tissue infection with cellulitis abscess and possible involvement of the muscle fascia. Plan is analgesics, laboratory studies, IV antibiotics, incision and drainage, consultation to infectious disease and surgery, admission. Carline Villarreal.  Beverley Gregory MD, [address] [facility]  Attending Emergency  Physician               Gwen Haji MD  08/28/20 [id number]

## 2020-08-28 NOTE — ED NOTES
Pt resting in bed with even non labored breaths. Pt A&Ox4. Pt denies any complaints. Pt shows no signs of distress. Will continue to monitor.         Jeana Doherty RN  08/28/20 2898

## 2020-08-28 NOTE — H&P
Patient: Kem Benoit    Unit/Bed: 3347/8040-47    YOB: 2000    MRN: [id number]    Acct: [de-identified]     Admitting Diagnosis: Abscess of thigh [R64.596]  Abscess of thigh [L60.467]    Admit Date:  8/28/2020    CC: Swollen painful left inner thigh x3 days. HPI :  56-year-old female patient presented to the ED today with recurrent left inner thigh pain and swelling due to multiple abscesses. Status post I&D in the ED. First episode of left inner thigh abscess was on 8/7/2020. Patient was seen in the ED and discharged on antibiotics. The patient and her mother thinks she was sent home on Bactrim which she completed. However, her home medication shows Keflex and clindamycin. Presenting today with severe pain and swelling for more than 10/10 intensity. No history of fever or chills. Pain is exacerbated by touch and ambulation. Denies any history of IV drug abuse no history of diabetes. Patient is sexually active and is agreeable to HIV screening. Vital signs on admission temperature 98.6 °F, respiratory 16, heart rate 77, blood pressure 121/78, congestion 100% on room air. Notable labs in the ED CRP 35.4, WBC 12.2. Left femur x-ray which reviewed is negative for fracture or tissue gas formation. ED treatment included I&D for the abscess, IV vancomycin 1 g and Unasyn, tetanus shot, IV morphine 2 mg for pain control, IV fentanyl, IV Toradol and Tylenol. Review of Systems   Constitutional: Positive for activity change. Negative for appetite change, chills, diaphoresis, fatigue, fever and unexpected weight change. HENT: Negative for congestion, dental problem, drooling, ear discharge, ear pain, facial swelling, hearing loss, mouth sores, nosebleeds, postnasal drip, rhinorrhea, sinus pressure, sinus pain, sneezing, sore throat, tinnitus, trouble swallowing and voice change. Eyes: Negative for photophobia, pain, discharge, redness, itching and visual disturbance. Respiratory: Negative for apnea, cough, choking, chest tightness, shortness of breath, wheezing and stridor. Cardiovascular: Negative for chest pain, palpitations and leg swelling. Gastrointestinal: Negative for abdominal distention, abdominal pain, anal bleeding, blood in stool, constipation, diarrhea, nausea, rectal pain and vomiting. Endocrine: Negative for cold intolerance, heat intolerance, polydipsia, polyphagia and polyuria. Genitourinary: Negative for decreased urine volume, difficulty urinating, dyspareunia, dysuria, enuresis, flank pain, frequency, genital sores, hematuria, menstrual problem, pelvic pain, urgency, vaginal bleeding, vaginal discharge and vaginal pain. Musculoskeletal: Negative for arthralgias, back pain, joint swelling, myalgias and neck pain. Skin: Negative for color change, pallor, rash and wound. Allergic/Immunologic: Negative for food allergies and immunocompromised state. Neurological: Negative for dizziness, tremors, seizures, syncope, facial asymmetry, speech difficulty, weakness, light-headedness, numbness and headaches. Hematological: Negative for adenopathy. Does not bruise/bleed easily. Psychiatric/Behavioral: Negative for agitation, behavioral problems, confusion, dysphoric mood, self-injury, sleep disturbance and suicidal ideas. The patient is not nervous/anxious and is not hyperactive. Past Medical History:    History reviewed. No pertinent past medical history. Past Surgical History:    History reviewed. No pertinent surgical history. Medications Prior to Admission:    Prior to Admission medications    Medication Sig Start Date End Date Taking?  Authorizing Provider   cephALEXin (KEFLEX) 250 MG capsule Take 1 capsule by mouth 4 times daily 8/13/20   Eda Mcdowell, DO   ondansetron (ZOFRAN ODT) 4 MG disintegrating tablet Place 1 tablet under the tongue every 8 hours as needed for Nausea 8/13/20   Gita Galarza, DO   dicyclomine (BENTYL) 10 MG capsule Take 1 capsule by mouth every 6 hours as needed (cramps) 7/11/19   Deondre Burnett MD   FLUoxetine (PROZAC) 10 MG capsule Take 1-2 capsules by mouth daily 3/1/19 3/31/19  Xi Lucas MD   tretinoin (RETIN-A) 0.025 % gel Apply topically nightly. 3/1/19   Xi Lucas MD   benzoyl peroxide [facility] [address]) 5 % external liquid Apply topically 2 times daily. 2/16/18   Xi Lucas MD   clindamycin (CLINDAGEL) 1 % gel Twice daily 2/16/18   Xi Lucas MD   medroxyPROGESTERone (DEPO-PROVERA) 150 MG/ML injection Inject 1 mL into the muscle once for 1 dose 10/9/17 10/9/17  Elie Dodd MD   ferrous sulfate (FE TABS) 325 (65 FE) MG EC tablet Take 1 tablet by mouth 2 times daily 1/20/17 2/19/17  Xi Lucas MD       Allergies:    Patient has no known allergies. Social History:    TOBACCO:   reports that she is a non-smoker but has been exposed to tobacco smoke. She has never used smokeless tobacco.    ETOH:   reports no history of alcohol use.     Family History:        Problem Relation Age of Onset    Asthma Brother     Heart Disease Maternal Grandmother     High Blood Pressure Maternal Grandmother     Cancer Maternal Grandfather     High Blood Pressure Maternal Grandfather     High Cholesterol Maternal Grandfather     Learning Disabilities Maternal Grandfather     Stroke Maternal Grandfather     Substance Abuse Maternal Grandfather     Learning Disabilities Maternal Aunt     Learning Disabilities Maternal Uncle     Substance Abuse Maternal Uncle     Arthritis Neg Hx     Birth Defects Neg Hx     Depression Neg Hx     Diabetes Neg Hx     Early Death Neg Hx     Hearing Loss Neg Hx     Kidney Disease Neg Hx     Mental Illness Neg Hx     Mental Retardation Neg Hx     Miscarriages / Stillbirths Neg Hx        Objective:   /76   Pulse 69   Temp 98.7 °F (37.1 °C) (Oral)   Resp 14   Ht 5' 3\" (1.6 m)   Wt 150 lb (68 kg)   LMP 07/15/2020   SpO2 100%   BMI 26.57 kg/m²   No intake or output data in the 24 hours ending 08/28/20 1914    Physical Exam  Vitals signs and nursing note reviewed. Constitutional:       General: She is not in acute distress. Appearance: Normal appearance. She is normal weight. She is not ill-appearing, toxic-appearing or diaphoretic. HENT:      Head: Normocephalic and atraumatic. Nose: Nose normal. No congestion or rhinorrhea. Mouth/Throat:      Mouth: Mucous membranes are moist.      Pharynx: Oropharynx is clear. No oropharyngeal exudate or posterior oropharyngeal erythema. Eyes:      General: No scleral icterus. Right eye: No discharge. Left eye: No discharge. Extraocular Movements: Extraocular movements intact. Conjunctiva/sclera: Conjunctivae normal.      Pupils: Pupils are equal, round, and reactive to light. Neck:      Musculoskeletal: Normal range of motion and neck supple. No neck rigidity or muscular tenderness. Vascular: No carotid bruit. Cardiovascular:      Rate and Rhythm: Normal rate and regular rhythm. Pulses: Normal pulses. Heart sounds: Normal heart sounds. No murmur. No friction rub. No gallop. Pulmonary:      Effort: Pulmonary effort is normal. No respiratory distress. Breath sounds: Normal breath sounds. No stridor. No wheezing, rhonchi or rales. Chest:      Chest wall: No tenderness. Abdominal:      General: Abdomen is flat. Bowel sounds are normal. There is no distension. Palpations: Abdomen is soft. There is no mass. Tenderness: There is no abdominal tenderness. There is no right CVA tenderness, left CVA tenderness, guarding or rebound. Hernia: No hernia is present. Musculoskeletal: Normal range of motion. General: Swelling and tenderness present. No deformity or signs of injury. Right lower leg: No edema. Left lower leg: No edema. Lymphadenopathy:      Cervical: No cervical adenopathy.    Skin: General: Skin is warm and dry. Coloration: Skin is not jaundiced or pale. Findings: No bruising, erythema, lesion or rash. Neurological:      General: No focal deficit present. Mental Status: She is alert and oriented to person, place, and time. Mental status is at baseline. Cranial Nerves: No cranial nerve deficit. Sensory: No sensory deficit. Motor: No weakness. Coordination: Coordination normal.      Gait: Gait normal.      Deep Tendon Reflexes: Reflexes normal.   Psychiatric:         Mood and Affect: Mood normal.         Behavior: Behavior normal.         Thought Content: Thought content normal.         Judgment: Judgment normal.     Medications:     Continuous Infusions:    PRN Meds:    Data:    CBC:   Recent Labs     08/28/20  0818   WBC 12.2   RBC 4.30   HGB 12.0   HCT 37.2   MCV 86.5   RDW 15.6*   *       BMP:   Recent Labs     08/28/20  0818      K 4.5      CO2 23   BUN 10   CREATININE 0.41*     Results for Wisam Huang (MRN [id number]) as of 8/28/2020 12:53   Ref. Range 8/28/2020 08:18   CRP Latest Ref Range: 0.0 - 5.0 mg/L 35.4 (H)     ABG. None. URINALYSIS. None. SEROLOGY  Results for Wisam Huang (MRN [id number]) as of 8/28/2020 16:15   Ref. Range 8/28/2020 08:18   Hepatitis C Ab Latest Ref Range: NONREACTIVE  NONREACTIVE     TUMOR MARKERS. None. MICROBIOLOGY   Abscess Gram stain-08/28/2020. FEW GRAM POSITIVE COCCI IN CLUSTERS. HISTOPATHOLOGY. None. TOXICOLOGY. None. ENDOSCOPE STUDIES. None. PROCEDURES. None. RADIOLOGY. Left femur x-ray-08/28/2020.     FINDINGS:  Normal alignment at the hip and knee. Normal mineralization of the femur. No acute bony abnormality. Soft tissues show no radiopaque foreign body. About 17 cm above the knee there is small lucency in the subcutaneous fat  along the inner aspect of the thigh. This could be artifact or soft tissue gas.     IMPRESSION:  1.  No acute osseous abnormality. 2. Small subcutaneous fat lucency along the inner aspect of the left thigh      about 17 cm above knee. This could be soft tissue gas or artifact.     ASSESSMENT AND  PLAN. 1.ID. Recurrent left thigh abscess and cellulitis due to GPC in clusters -continue IV vancomycin      and Unasyn. ID consult. 2.PSYCH. Suspected anxiety/depression on Prozac. 3. LIFE STYLE. Chronic cannabis use. 4.HEM-ONC. Elevated CRP 35.4. Reactive thrombocytosis due to infection-platelets 139. 5.DISPO. Planned d/c to home soon.       Electronically signed by Martinez Arzola MD on 8/28/2020 at 7:14 PM

## 2020-08-28 NOTE — ED NOTES
Pt alert and oriented x4, states pain level 3/10.  Pt reports that she is \"comfortable\" and has no further complaints at this time      Josesito Billings RN  08/28/20 1102

## 2020-08-28 NOTE — CONSULTS
[facility] - Initial Consult Note  Today's Date and Time: 8/28/2020, 10:17 AM    Impression :   · Left thigh abscess with surrounding cellulitis. Most likely secondary to staph aureus  · Failed to respond to oral antibiotics. Unable to tolerate antibiotics by mouth    Recommendations:   · Admission to [facility] for IV antibiotics  · IV vancomycin until culture comes back  · I&D of the abscess -not yet drained  · Await culture sensitivities for targeted therapy    Medical Decision Making/Summary/Discussion:   ·   Infection Control Recommendations   · [place] Precautions  Antimicrobial Stewardship Recommendations   · Simplification of therapy  · Targeted therapy    Coordination of Outpatient Care:   · Estimated Length of IV antimicrobials: TBD  · Patient will need Midline Catheter Insertion: NO   · Patient will need PICC line Insertion: NO  · Patient will need: Home IV , [place],  SNF,  LTAC TBD  · Patient will need outpatient wound care: YES    Chief complaint/reason for consultation:   · Left thigh abscess with gram +cocci in pairs      History of Present Illness:   Jodie Agarwal is a 20y.o.-year-old  female who was initially admitted on 8/28/2020. Patient seen at the request of Dr. Quintin Hodge:    Patient with no significant medical history. Presents to the ER 8/28/2020 with persistent and worsening left thigh infection. Patient states that on August 7-2020 she noticed what was later diagnosed as an abscess. Presented to the ED at that time and the initial abscess was drained and she was treated with oral Bactrim and clindamycin. Patient reports that the antibiotics made her vomit, so she stopped taking them. Patient states she tried Zofran but did not help with her nausea. Patient states that the pain and swelling has increased, and has experienced fevers and chills even though she is not taking the antibiotics anymore.     On surgical history.     Medications:      vancomycin  15 mg/kg Intravenous Once       Social History:     Social History     Socioeconomic History    Marital status: Single     Spouse name: Not on file    Number of children: Not on file    Years of education: Not on file    Highest education level: Not on file   Occupational History    Not on file   Social Needs    Financial resource strain: Not on file    Food insecurity     Worry: Not on file     Inability: Not on file    Transportation needs     Medical: Not on file     Non-medical: Not on file   Tobacco Use    Smoking status: Passive Smoke Exposure - Never Smoker    Smokeless tobacco: Never Used   Substance and Sexual Activity    Alcohol use: No    Drug use: No    Sexual activity: Yes     Partners: Male   Lifestyle    Physical activity     Days per week: Not on file     Minutes per session: Not on file    Stress: Not on file   Relationships    Social connections     Talks on phone: Not on file     Gets together: Not on file     Attends Anabaptist service: Not on file     Active member of club or organization: Not on file     Attends meetings of clubs or organizations: Not on file     Relationship status: Not on file    Intimate partner violence     Fear of current or ex partner: Not on file     Emotionally abused: Not on file     Physically abused: Not on file     Forced sexual activity: Not on file   Other Topics Concern    Not on file   Social History Narrative    Not on file       Family History:     Family History   Problem Relation Age of Onset    Asthma Brother     Heart Disease Maternal Grandmother     High Blood Pressure Maternal Grandmother     Cancer Maternal Grandfather     High Blood Pressure Maternal Grandfather     High Cholesterol Maternal Grandfather     Learning Disabilities Maternal Grandfather     Stroke Maternal Grandfather     Substance Abuse Maternal Grandfather     Learning Disabilities Maternal Aunt     Learning repair. Neck:Supple, without lymphadenopathy. Thyroid normal, No bruits. Pulmonary/Chest: Clear to auscultation, without wheezes, rales, or rhonchi. No dullness to percussion. No egophony. Cardiovascular: Regular rate and rhythm without murmurs, rubs, or gallops. Abdomen: Soft, non tender. Bowel sounds normal. No organomegaly  All four Extremities: No cyanosis, clubbing, edema, or effusions. Neurologic: No gross sensory or motor deficits. Skin: Open wound to left thigh, incision and drainage performed to the medial thigh. Abscess of left thigh    Medical Decision Making -Laboratory:   I have independently reviewed/ordered the following labs:    CBC with Differential:   Recent Labs     08/28/20  0818   WBC 12.2   HGB 12.0   HCT 37.2   *   LYMPHOPCT 16*   MONOPCT 5     BMP:   Recent Labs     08/28/20 0818      K 4.5      CO2 23   BUN 10   CREATININE 0.41*     Hepatic Function Panel: No results for input(s): PROT, LABALBU, BILIDIR, IBILI, BILITOT, ALKPHOS, ALT, AST in the last 72 hours. No results for input(s): RPR in the last 72 hours. No results for input(s): HIV in the last 72 hours. No results for input(s): BC in the last 72 hours. Lab Results   Component Value Date    RBC 4.30 08/28/2020    WBC 12.2 08/28/2020     Lab Results   Component Value Date    CREATININE 0.41 08/28/2020    GLUCOSE 95 08/28/2020       Medical Decision Making-Imaging:     EXAMINATION:    XRAY VIEWS OF THE LEFT FEMUR         8/28/2020 7:40 am         COMPARISON:    None.         HISTORY:    ORDERING SYSTEM PROVIDED HISTORY: Overlying cellulitis/abscess, please eval    for subcutaneous air    TECHNOLOGIST PROVIDED HISTORY:    Overlying cellulitis/abscess, please eval for subcutaneous air    Reason for Exam: overlying cellulitis/abscess, please eval for subcutaneous    air    Acuity: Unknown         FINDINGS:    Normal alignment at the hip and knee.  Normal mineralization of the femur.     No acute bony abnormality.  Soft tissues show no radiopaque foreign body. About 17 cm above the knee there is small lucency in the subcutaneous fat    along the inner aspect of the thigh.  This could be artifact or soft tissue    gas.              Impression    1. No acute osseous abnormality. 2. Small subcutaneous fat lucency along the inner aspect of the left thigh    about 17 cm above knee.  This could be soft tissue gas or artifact.               Medical Decision Making-Other: Thank you for allowing us to participate in the care of this patient. Please call with questions. Nanci Romberg, DPM     ATTESTATION:    I have discussed the case, including pertinent history and exam findings with the residents and students. I have seen and examined the patient and the key elements of the encounter have been performed by me. I was present when the student obtained his information or examined the patient. I have reviewed the laboratory data, other diagnostic studies and discussed them with the residents. I have updated the medical record where necessary. I agree with the assessment, plan and orders as documented by the resident/ student.     Susana Gandhi MD.    Pager: [phone] - Office: [phone]

## 2020-08-28 NOTE — ED NOTES
Pt reports pain 7/10 but does not wish to be medicated at this time. Pt reports her pain is better. Pt alert and oriented x4 in no signs of acute distress. Pt has no current complaints at this time.       Eddy Laureano RN  08/28/20 1014

## 2020-08-28 NOTE — ED NOTES
Dr. Silvia Lugo at bedside draining abscess. Wound cultures obtained.        Manuela Cortés RN  08/28/20 [id number]

## 2020-08-28 NOTE — ED PROVIDER NOTES
Incision/Drainage    Date/Time: 8/28/2020 8:40 AM  Performed by: Zechariah Milian DO  Authorized by: Josep Yang MD     Consent:     Consent obtained:  Verbal    Consent given by:  Patient and parent    Risks discussed:  Pain, incomplete drainage, infection and bleeding    Alternatives discussed:  No treatment, delayed treatment and alternative treatment  Location:     Type:  Abscess    Size:  3 cm    Location:  Lower extremity    Lower extremity location:  Leg    Leg location:  L upper leg  Pre-procedure details:     Skin preparation:  Antiseptic wash  Anesthesia (see MAR for exact dosages): Anesthesia method:  Topical application and local infiltration    Topical anesthetic:  Lidocaine gel    Local anesthetic:  Lidocaine 1% WITH epi  Procedure type:     Complexity:  Complex  Procedure details:     Incision types:  Stab incision    Incision depth:  Dermal    Scalpel blade:  11    Wound management:  Probed and deloculated and irrigated with saline    Drainage:  Bloody and purulent    Drainage amount: Moderate    Wound treatment:  Wound left open    Packing materials:  None  Post-procedure details:     Patient tolerance of procedure:   Tolerated well, no immediate complications            Zechariah Milian DO  Resident  08/28/20 6434

## 2020-08-28 NOTE — ED NOTES
Pt reports itching due to medication. Benadryl given.  Pt reports relief      Salina Rouse RN  08/28/20 1038

## 2020-08-28 NOTE — CARE COORDINATION
Case Management Initial Discharge Plan  [place]illa Hawkins,             Met with:patient to discuss discharge plans. Information verified: address, contacts, phone number, , insurance Yes    Emergency Contact/Next of Kin name & number: Nicole(mother) [phone]    PCP: Huey Mckeon MD  Date of last visit: per mom: \"not since she was 16\" per Epic 3-2019    Insurance Provider: ABHAY    Discharge Planning    Living Arrangements:  Family Members, Parent   Support Systems:  Family Members, Parent    Home has 2 stories  3 stairs to climb to get into front door, 1 flight stairs to climb to reach second floor  Location of bedroom/bathroom in home bath on the 1st and 2nd floor, bedroom on the 2nd floor. Patient able to perform ADL's:Independent    Current Services (outpatient & in home) none  DME equipment: none  DME provider:     Receiving oral anticoagulation therapy? No    If indicated:   Physician managing anticoagulation treatment:   Where does patient obtain lab work for ATC treatment? Potential Assistance Needed:  N/A    Patient agreeable to home care: No  [place] of choice provided:  n/a    Prior SNF/Rehab Placement and Facility:   Agreeable to SNF/Rehab: No  [place] of choice provided: n/a     Evaluation: no    Expected Discharge date:  20    Patient expects to be discharged to:  return to home  Follow Up Appointment: Best Day/ Time:      Transportation provider: family  Transportation arrangements needed for discharge: No, family will transport. Readmission Risk              Risk of Unplanned Readmission:        5             Does patient have a readmission risk score greater than 14?: No  If yes, follow-up appointment must be made within 7 days of discharge.      Goals of Care: decreased pain      Discharge Plan: return to home          Electronically signed by Shadia Guerra RN on 20 at 1:31 PM EDT

## 2020-08-28 NOTE — ED NOTES
Pt c/o pain 7/10, requesting pain medication. Percocet offered and pt accepted.  Pt has no further complaints at this time     Oscar Briggs RN  08/28/20 5815

## 2020-08-28 NOTE — ED NOTES
Pt resting in bed with even non labored breaths. Pt A&Ox4. Pt denies any complaints. Pt shows no signs of distress. Will continue to monitor.         Ree Linares RN  08/28/20 1002

## 2020-08-28 NOTE — PROGRESS NOTES
Pharmacy Note  Vancomycin Consult    Xi Hensley is a 20 y.o. female started on Vancomycin for cellulitis; consult received from Dr. Katlyn Agosto  to manage therapy. Also receiving the following antibiotics: unasyn. Patient Active Problem List   Diagnosis    Abscess of thigh       Allergies:  Patient has no known allergies. Temp max: 37.1    Recent Labs     08/28/20  0818   BUN 10       Recent Labs     08/28/20  0818   CREATININE 0.41*       Recent Labs     08/28/20  0818   WBC 12.2       No intake or output data in the 24 hours ending 08/28/20 1526    Culture Date      Source                       Results  8/28                    Blood                          pending  8/28                    Abscess                      GPC in clusters    Ht Readings from Last 1 Encounters:   08/28/20 5' 3\" (1.6 m)        Wt Readings from Last 1 Encounters:   08/28/20 150 lb (68 kg)         Body mass index is 26.57 kg/m². Estimated Creatinine Clearance: 202 mL/min (A) (based on SCr of 0.41 mg/dL (L)). Goal Trough Level: 10-20 mcg/mL    Assessment/Plan:  Will initiate Vancomycin with a one time loading dose of 1000 mg x1, followed by 1000 mg IV every 8 hours. Timing of trough level will be determined based on culture results, renal function, and clinical response. Thank you for the consult. Will continue to follow. Tenisha Yeager Pharm. D.

## 2020-08-28 NOTE — ED NOTES
Meal tray provided. Pt alert and oriented x4 with no signs of acute distress. [place] juice provided per pt request. Pt has no current complaints at this time.      Meli Madrid RN  08/28/20 9688

## 2020-08-28 NOTE — ED PROVIDER NOTES
Care of Jodie Agarwal was assumed from previous attending and is being seen for Abscess (Left thigh)  . The patient's initial evaluation and plan have been discussed with the prior provider who initially evaluated the patient. Handoff taken on the following patient from prior Attending Physician:    David Campbell    I was available and discussed any additional care issues that arose and coordinated the management plans with the resident(s) caring for the patient during my duty period. Any areas of disagreement with residents documentation of care or procedures are noted on the chart. I was personally present for the key portions of any/all procedures during my duty period. I have documented in the chart those procedures where I was not present during the key portions.       EMERGENCY DEPARTMENT COURSE / MEDICAL DECISION MAKING:       MEDICATIONS GIVEN:  Orders Placed This Encounter   Medications    acetaminophen (TYLENOL) tablet 1,000 mg    Tetanus-Diphth-Acell Pertussis (BOOSTRIX) injection 0.5 mL    lidocaine (LMX) 4 % cream    morphine injection 2 mg    fentaNYL (SUBLIMAZE) injection 50 mcg    vancomycin (VANCOCIN) 1000 mg in dextrose 5% 200 mL IVPB    ketorolac (TORADOL) injection 30 mg    sodium chloride flush 0.9 % injection 10 mL    sodium chloride flush 0.9 % injection 10 mL    OR Linked Order Group     potassium chloride (KLOR-CON M) extended release tablet 40 mEq     potassium bicarb-citric acid (EFFER-K) effervescent tablet 40 mEq     potassium chloride 10 mEq/100 mL IVPB (Peripheral Line)    magnesium sulfate 1 g in dextrose 5% 100 mL IVPB    acetaminophen (TYLENOL) tablet 650 mg    DISCONTD: acetaminophen (TYLENOL) suppository 650 mg    polyethylene glycol (GLYCOLAX) packet 17 g    OR Linked Order Group     promethazine (PHENERGAN) tablet 12.5 mg     ondansetron (ZOFRAN) injection 4 mg    nicotine (NICODERM CQ) 21 MG/24HR 1 patch    enoxaparin (LOVENOX) injection 40 mg    ketorolac (TORADOL) injection 30 mg    ampicillin-sulbactam (UNASYN) 1.5 g IVPB minibag    diphenhydrAMINE (BENADRYL) injection 25 mg    oxyCODONE-acetaminophen (PERCOCET) 5-325 MG per tablet 1 tablet    vancomycin (VANCOCIN) intermittent dosing (placeholder)    vancomycin (VANCOCIN) 1000 mg in dextrose 5% 200 mL IVPB       LABS / RADIOLOGY:     Labs Reviewed   CULTURE, ANAEROBIC AND AEROBIC - Abnormal; Notable for the following components:       Result Value    Direct Exam FEW NEUTROPHILS (*)     Direct Exam FEW GRAM POSITIVE COCCI IN CLUSTERS (*)     All other components within normal limits   CBC WITH AUTO DIFFERENTIAL - Abnormal; Notable for the following components:    RDW 15.6 (*)     Platelets 702 (*)     Seg Neutrophils 74 (*)     Lymphocytes 16 (*)     Immature Granulocytes 1 (*)     Segs Absolute 9.13 (*)     All other components within normal limits   BASIC METABOLIC PANEL W/ REFLEX TO MG FOR LOW K - Abnormal; Notable for the following components:    CREATININE 0.41 (*)     All other components within normal limits   C-REACTIVE PROTEIN - Abnormal; Notable for the following components:    CRP 35.4 (*)     All other components within normal limits   CULTURE, BLOOD 1   CULTURE, BLOOD 2   CULTURE, ANAEROBIC AND AEROBIC   HCG, SERUM, QUALITATIVE   URINE DRUG SCREEN, COMPREHENSIVE   URINE RT REFLEX TO CULTURE   HIV SCREEN   HEPATITIS C ANTIBODY       Xr Femur Left (min 2 Views)    Result Date: 8/28/2020  EXAMINATION: XRAY VIEWS OF THE LEFT FEMUR 8/28/2020 7:40 am COMPARISON: None. HISTORY: ORDERING SYSTEM PROVIDED HISTORY: Overlying cellulitis/abscess, please eval for subcutaneous air TECHNOLOGIST PROVIDED HISTORY: Overlying cellulitis/abscess, please eval for subcutaneous air Reason for Exam: overlying cellulitis/abscess, please eval for subcutaneous air Acuity: Unknown FINDINGS: Normal alignment at the hip and knee. Normal mineralization of the femur. No acute bony abnormality.   Soft tissues show no radiopaque foreign body. About 17 cm above the knee there is small lucency in the subcutaneous fat along the inner aspect of the thigh. This could be artifact or soft tissue gas. 1. No acute osseous abnormality. 2. Small subcutaneous fat lucency along the inner aspect of the left thigh about 17 cm above knee. This could be soft tissue gas or artifact. Xr Femur Left (min 2 Views)    Result Date: 8/13/2020  EXAMINATION: 4 XRAY VIEWS OF THE LEFT FEMUR 8/13/2020 4:16 pm COMPARISON: None. HISTORY: ORDERING SYSTEM PROVIDED HISTORY: diffuse drainage from complicated abcess. wanting to evaluate for necrosis, gas, abcess, bony involvement TECHNOLOGIST PROVIDED HISTORY: diffuse drainage from complicated abcess. wanting to evaluate for necrosis, gas, abcess, bony involvement Reason for Exam: open wound to medial aspect distal femur x 1 wk, no injury Acuity: Acute Type of Exam: Unknown FINDINGS: There is no evidence of acute fracture. There is normal alignment. No acute joint abnormality. No focal osseous lesion. No focal soft tissue abnormality. No soft tissue gas identified. No acute osseous abnormality. RECENT VITALS:     Temp: 98.8 °F (37.1 °C),  Pulse: 77, Resp: 16, BP: 109/65, SpO2: 100 %    This patient is a 20 y.o. Female with thigh abscess, failed outpatient treatment, getting vancomycin    OUTSTANDING TASKS / RECOMMENDATIONS:    1.  Admit, pending bed placement      Earnest Eller DO  Attending Emergency Physician  [facility] [address] [facility]       [place], [place]  08/28/20 8797

## 2020-08-29 PROBLEM — A49.01 STAPHYLOCOCCUS AUREUS INFECTION: Status: ACTIVE | Noted: 2020-08-29

## 2020-08-29 LAB
-: NORMAL
ANION GAP SERPL CALCULATED.3IONS-SCNC: 9 MMOL/L (ref 9–17)
BUN BLDV-MCNC: 6 MG/DL (ref 6–20)
BUN/CREAT BLD: ABNORMAL (ref 9–20)
CALCIUM SERPL-MCNC: 9.2 MG/DL (ref 8.6–10.4)
CHLORIDE BLD-SCNC: 109 MMOL/L (ref 98–107)
CO2: 21 MMOL/L (ref 20–31)
CREAT SERPL-MCNC: 0.49 MG/DL (ref 0.5–0.9)
GFR AFRICAN AMERICAN: >60 ML/MIN
GFR NON-AFRICAN AMERICAN: >60 ML/MIN
GFR SERPL CREATININE-BSD FRML MDRD: ABNORMAL ML/MIN/{1.73_M2}
GFR SERPL CREATININE-BSD FRML MDRD: ABNORMAL ML/MIN/{1.73_M2}
GLUCOSE BLD-MCNC: 89 MG/DL (ref 70–99)
POTASSIUM SERPL-SCNC: 3.7 MMOL/L (ref 3.7–5.3)
REASON FOR REJECTION: NORMAL
SODIUM BLD-SCNC: 139 MMOL/L (ref 135–144)
VANCOMYCIN TROUGH DATE LAST DOSE: NORMAL
VANCOMYCIN TROUGH DOSE AMOUNT: NORMAL
VANCOMYCIN TROUGH TIME LAST DOSE: NORMAL
VANCOMYCIN TROUGH: 10.8 UG/ML (ref 10–20)
ZZ NTE CLEAN UP: ORDERED TEST: NORMAL
ZZ NTE WITH NAME CLEAN UP: SPECIMEN SOURCE: NORMAL

## 2020-08-29 PROCEDURE — 6360000002 HC RX W HCPCS: Performed by: NURSE PRACTITIONER

## 2020-08-29 PROCEDURE — 85027 COMPLETE CBC AUTOMATED: CPT

## 2020-08-29 PROCEDURE — 2580000003 HC RX 258: Performed by: NURSE PRACTITIONER

## 2020-08-29 PROCEDURE — 99232 SBSQ HOSP IP/OBS MODERATE 35: CPT | Performed by: INTERNAL MEDICINE

## 2020-08-29 PROCEDURE — 80202 ASSAY OF VANCOMYCIN: CPT

## 2020-08-29 PROCEDURE — 80048 BASIC METABOLIC PNL TOTAL CA: CPT

## 2020-08-29 PROCEDURE — 6360000002 HC RX W HCPCS: Performed by: INTERNAL MEDICINE

## 2020-08-29 PROCEDURE — 2580000003 HC RX 258: Performed by: INTERNAL MEDICINE

## 2020-08-29 PROCEDURE — 36415 COLL VENOUS BLD VENIPUNCTURE: CPT

## 2020-08-29 PROCEDURE — 6370000000 HC RX 637 (ALT 250 FOR IP): Performed by: INTERNAL MEDICINE

## 2020-08-29 PROCEDURE — 1200000000 HC SEMI PRIVATE

## 2020-08-29 RX ORDER — LIDOCAINE HYDROCHLORIDE AND EPINEPHRINE 10; 10 MG/ML; UG/ML
20 INJECTION, SOLUTION INFILTRATION; PERINEURAL ONCE
Status: DISCONTINUED | OUTPATIENT
Start: 2020-08-29 | End: 2020-08-31 | Stop reason: HOSPADM

## 2020-08-29 RX ORDER — FUROSEMIDE 10 MG/ML
20 INJECTION INTRAMUSCULAR; INTRAVENOUS ONCE
Status: COMPLETED | OUTPATIENT
Start: 2020-08-29 | End: 2020-08-29

## 2020-08-29 RX ORDER — LIDOCAINE HYDROCHLORIDE 10 MG/ML
5 INJECTION, SOLUTION EPIDURAL; INFILTRATION; INTRACAUDAL; PERINEURAL ONCE
Status: DISCONTINUED | OUTPATIENT
Start: 2020-08-29 | End: 2020-08-29

## 2020-08-29 RX ORDER — LIDOCAINE HYDROCHLORIDE 10 MG/ML
5 INJECTION, SOLUTION EPIDURAL; INFILTRATION; INTRACAUDAL; PERINEURAL ONCE
Status: DISCONTINUED | OUTPATIENT
Start: 2020-08-29 | End: 2020-08-31 | Stop reason: HOSPADM

## 2020-08-29 RX ADMIN — AMPICILLIN SODIUM AND SULBACTAM SODIUM 1.5 G: 1; .5 INJECTION, POWDER, FOR SOLUTION INTRAMUSCULAR; INTRAVENOUS at 16:37

## 2020-08-29 RX ADMIN — OXYCODONE HYDROCHLORIDE AND ACETAMINOPHEN 1 TABLET: 5; 325 TABLET ORAL at 21:07

## 2020-08-29 RX ADMIN — VANCOMYCIN HYDROCHLORIDE 1000 MG: 1 INJECTION, SOLUTION INTRAVENOUS at 10:13

## 2020-08-29 RX ADMIN — DIPHENHYDRAMINE HYDROCHLORIDE 25 MG: 50 INJECTION, SOLUTION INTRAMUSCULAR; INTRAVENOUS at 03:41

## 2020-08-29 RX ADMIN — AMPICILLIN SODIUM AND SULBACTAM SODIUM 1.5 G: 1; .5 INJECTION, POWDER, FOR SOLUTION INTRAMUSCULAR; INTRAVENOUS at 03:41

## 2020-08-29 RX ADMIN — VANCOMYCIN HYDROCHLORIDE 1000 MG: 1 INJECTION, SOLUTION INTRAVENOUS at 01:40

## 2020-08-29 RX ADMIN — DIPHENHYDRAMINE HYDROCHLORIDE 25 MG: 50 INJECTION, SOLUTION INTRAMUSCULAR; INTRAVENOUS at 11:11

## 2020-08-29 RX ADMIN — AMPICILLIN SODIUM AND SULBACTAM SODIUM 1.5 G: 1; .5 INJECTION, POWDER, FOR SOLUTION INTRAMUSCULAR; INTRAVENOUS at 21:07

## 2020-08-29 RX ADMIN — FUROSEMIDE 20 MG: 10 INJECTION, SOLUTION INTRAMUSCULAR; INTRAVENOUS at 22:19

## 2020-08-29 RX ADMIN — AMPICILLIN SODIUM AND SULBACTAM SODIUM 1.5 G: 1; .5 INJECTION, POWDER, FOR SOLUTION INTRAMUSCULAR; INTRAVENOUS at 09:38

## 2020-08-29 RX ADMIN — SODIUM CHLORIDE: 9 INJECTION, SOLUTION INTRAVENOUS at 03:41

## 2020-08-29 RX ADMIN — VANCOMYCIN HYDROCHLORIDE 1000 MG: 1 INJECTION, SOLUTION INTRAVENOUS at 23:30

## 2020-08-29 RX ADMIN — OXYCODONE HYDROCHLORIDE AND ACETAMINOPHEN 1 TABLET: 5; 325 TABLET ORAL at 16:41

## 2020-08-29 RX ADMIN — Medication 10 ML: at 11:11

## 2020-08-29 ASSESSMENT — ENCOUNTER SYMPTOMS
EYE ITCHING: 0
SINUS PRESSURE: 0
BLOOD IN STOOL: 0
STRIDOR: 0
APNEA: 0
ABDOMINAL DISTENTION: 0
COLOR CHANGE: 0
ABDOMINAL PAIN: 0
ANAL BLEEDING: 0
CONSTIPATION: 0
EYE DISCHARGE: 0
EYE PAIN: 0
NAUSEA: 0
VOICE CHANGE: 0
WHEEZING: 0
SINUS PAIN: 0
PHOTOPHOBIA: 0
BACK PAIN: 0
CHOKING: 0
EYE REDNESS: 0
RECTAL PAIN: 0
SHORTNESS OF BREATH: 0

## 2020-08-29 ASSESSMENT — PAIN SCALES - GENERAL
PAINLEVEL_OUTOF10: 4
PAINLEVEL_OUTOF10: 6
PAINLEVEL_OUTOF10: 3
PAINLEVEL_OUTOF10: 7
PAINLEVEL_OUTOF10: 7

## 2020-08-29 ASSESSMENT — PAIN DESCRIPTION - PAIN TYPE: TYPE: ACUTE PAIN

## 2020-08-29 ASSESSMENT — PAIN DESCRIPTION - ORIENTATION: ORIENTATION: LEFT

## 2020-08-29 ASSESSMENT — PAIN DESCRIPTION - FREQUENCY: FREQUENCY: CONTINUOUS

## 2020-08-29 ASSESSMENT — PAIN DESCRIPTION - LOCATION: LOCATION: LEG

## 2020-08-29 ASSESSMENT — PAIN DESCRIPTION - DESCRIPTORS: DESCRIPTORS: CRAMPING;CONSTANT

## 2020-08-29 NOTE — CONSULTS
General Surgery:  Consult Note        PATIENT NAME: Jack Castillo   YOB: 2000    ADMISSION DATE: 8/28/2020  7:01 AM     Admitting Provider: Dr. Rowe Mohs, MD    Consulted Physician: Dr. Arely Clements MD     TODAY'S DATE: 8/29/2020    Chief Complaint:  Abscess  Consult Regarding:  Same    HISTORY OF PRESENT ILLNESS:  The patient is a 20 y.o. female  who was admitted for IV antibiotics due to left medial upper thigh abscess. Patient states she has had this swelling and pain since the beginning of August.  Patient was first seen on 8-7-2020 for a left thigh abscess and was sent home on antibiotics. Patient was to follow-up outpatient but no records of follow-up. She presented yesterday with complaints of left thigh swelling. Incision and drainage was performed in the emergency department with culture sent. Infectious disease was consulted and prescribed antibiotics. Patient's white count is been within normal limits, she has been afebrile, non-tachycardic. Continued pain, induration, and swelling in the left thigh. General surgery was consulted for evaluation. On evaluation, patient's left medial thigh is swollen, indurated, erythematous and tender to palpation in an area measuring 4 x 4 cm. Attempted to gently probe the wound, however patient aggressively pushed away probe in my hand. The wound is concerning for further infection and abscess. Past Medical History:    History reviewed. No pertinent past medical history. Past Surgical History:    History reviewed. No pertinent surgical history.     Medications Prior to Admission:   Medications Prior to Admission: cephALEXin (KEFLEX) 250 MG capsule, Take 1 capsule by mouth 4 times daily  ondansetron (ZOFRAN ODT) 4 MG disintegrating tablet, Place 1 tablet under the tongue every 8 hours as needed for Nausea  dicyclomine (BENTYL) 10 MG capsule, Take 1 capsule by mouth every 6 hours as needed (cramps)  FLUoxetine (PROZAC) 10 MG capsule, Take 1-2 capsules by mouth daily  tretinoin (RETIN-A) 0.025 % gel, Apply topically nightly. benzoyl peroxide [facility] W WASH) 5 % external liquid, Apply topically 2 times daily. clindamycin (CLINDAGEL) 1 % gel, Twice daily  medroxyPROGESTERone (DEPO-PROVERA) 150 MG/ML injection, Inject 1 mL into the muscle once for 1 dose  ferrous sulfate (FE TABS) 325 (65 FE) MG EC tablet, Take 1 tablet by mouth 2 times daily    Allergies:  Patient has no known allergies.     Social History:   Social History     Socioeconomic History    Marital status: Single     Spouse name: Not on file    Number of children: Not on file    Years of education: Not on file    Highest education level: Not on file   Occupational History    Not on file   Social Needs    Financial resource strain: Not on file    Food insecurity     Worry: Not on file     Inability: Not on file    Transportation needs     Medical: Not on file     Non-medical: Not on file   Tobacco Use    Smoking status: Passive Smoke Exposure - Never Smoker    Smokeless tobacco: Never Used   Substance and Sexual Activity    Alcohol use: No    Drug use: No    Sexual activity: Yes     Partners: Male   Lifestyle    Physical activity     Days per week: Not on file     Minutes per session: Not on file    Stress: Not on file   Relationships    Social connections     Talks on phone: Not on file     Gets together: Not on file     Attends Anabaptist service: Not on file     Active member of club or organization: Not on file     Attends meetings of clubs or organizations: Not on file     Relationship status: Not on file    Intimate partner violence     Fear of current or ex partner: Not on file     Emotionally abused: Not on file     Physically abused: Not on file     Forced sexual activity: Not on file   Other Topics Concern    Not on file   Social History Narrative    Not on file       Family History:       Problem Relation Age of Onset    Asthma Brother     Heart Disease Maternal Grandmother     High Blood Pressure Maternal Grandmother     Cancer Maternal Grandfather     High Blood Pressure Maternal Grandfather     High Cholesterol Maternal Grandfather     Learning Disabilities Maternal Grandfather     Stroke Maternal Grandfather     Substance Abuse Maternal Grandfather     Learning Disabilities Maternal Aunt     Learning Disabilities Maternal Uncle     Substance Abuse Maternal Uncle     Arthritis Neg Hx     Birth Defects Neg Hx     Depression Neg Hx     Diabetes Neg Hx     Early Death Neg Hx     Hearing Loss Neg Hx     Kidney Disease Neg Hx     Mental Illness Neg Hx     Mental Retardation Neg Hx     Miscarriages / Stillbirths Neg Hx        REVIEW OF SYSTEMS:    CONSTITUTIONAL: Denies fevers and chills  HEENT: Denies rhinorrhea, dysphagia, odynphagia. CARDIOVASCULAR: Denies history of MI, recent chest pain. RESPIRATORY: Denies recent history of shortness of breath or history of PE. GASTROINTESTINAL: Denies abdominal pain, nausea, vomiting  GENITOURINARY: Denies increased frequency or dysuria. HEMATOLOGIC/LYMPHATIC: Denies history of anemia or DVTs. ENDOCRINE: Denies history of thyroid problems or diabetes. NEURO: Denies history of CVA, TIA. Review of systems negative unless listed above. PHYSICAL EXAM:    VITALS:  /68   Pulse 70   Temp 98.6 °F (37 °C) (Oral)   Resp 15   Ht 5' 3\" (1.6 m)   Wt 150 lb (68 kg)   LMP 07/15/2020   SpO2 100%   BMI 26.57 kg/m²   INTAKE/OUTPUT:     Intake/Output Summary (Last 24 hours) at 8/29/2020 1743  Last data filed at 8/29/2020 6725  Gross per 24 hour   Intake 1195 ml   Output --   Net 1195 ml       CONSTITUTIONAL: Alert, oriented  HEENT: Facial swelling noted  NECK:  Supple, symmetrical, trachea midline   CARDIOVASCULAR: Regular rate and rhythm   LUNGS: Clear to auscultation bilaterally without evidence of wheezing or tachypnea. ABDOMEN: Soft, nondistended, nontender to palpation.   MUSCULOSKELETAL: Muscle strength intact in all extremities bilaterally. NEUROLOGIC: CN II- XII intact. Gross motor intact without focal weakness. SKIN: THERE are 2 openings in the left medial thigh. The superior opening which is horizontal and roughly 1.5 cm appears to have extensive fibrous exudate with necrotic edges surrounded by erythema and cellulitis, which measures 4 x  4 cm. The inferior incision is round with no obvious drainage. CBC with Differential:    Lab Results   Component Value Date    WBC 12.2 08/28/2020    RBC 4.30 08/28/2020    HGB 12.0 08/28/2020    HCT 37.2 08/28/2020     08/28/2020    MCV 86.5 08/28/2020    MCH 27.9 08/28/2020    MCHC 32.3 08/28/2020    RDW 15.6 08/28/2020    LYMPHOPCT 16 08/28/2020    MONOPCT 5 08/28/2020    BASOPCT 1 08/28/2020    MONOSABS 0.63 08/28/2020    LYMPHSABS 1.98 08/28/2020    EOSABS 0.30 08/28/2020    BASOSABS 0.07 08/28/2020    DIFFTYPE NOT REPORTED 08/28/2020     BMP:    Lab Results   Component Value Date     08/29/2020    K 3.7 08/29/2020     08/29/2020    CO2 21 08/29/2020    BUN 6 08/29/2020    CREATININE 0.49 08/29/2020    CALCIUM 9.2 08/29/2020    GFRAA >60 08/29/2020    LABGLOM >60 08/29/2020    GLUCOSE 89 08/29/2020       Pertinent Radiology:   Xr Femur Left (min 2 Views)    Result Date: 8/28/2020  EXAMINATION: XRAY VIEWS OF THE LEFT FEMUR 8/28/2020 7:40 am COMPARISON: None. HISTORY: ORDERING SYSTEM PROVIDED HISTORY: Overlying cellulitis/abscess, please eval for subcutaneous air TECHNOLOGIST PROVIDED HISTORY: Overlying cellulitis/abscess, please eval for subcutaneous air Reason for Exam: overlying cellulitis/abscess, please eval for subcutaneous air Acuity: Unknown FINDINGS: Normal alignment at the hip and knee. Normal mineralization of the femur. No acute bony abnormality. Soft tissues show no radiopaque foreign body. About 17 cm above the knee there is small lucency in the subcutaneous fat along the inner aspect of the thigh.   This could be artifact or soft tissue gas. 1. No acute osseous abnormality. 2. Small subcutaneous fat lucency along the inner aspect of the left thigh about 17 cm above knee. This could be soft tissue gas or artifact. ASSESSMENT:  Active Hospital Problems    Diagnosis Date Noted    Abscess of thigh [J10.027] 08/28/2020    Recurrent cellulitis of thigh [L03.119] 08/28/2020    Cannabis abuse without complication [N84.88] 00/20/0382    CRP elevated [R79.82] 08/28/2020    Reactive thrombocytosis [R79.89] 08/28/2020         Plan:  1. Continue medical mgmt and supportive care per primary  2. Emanation concern for continued abscess requiring drainage. Attempted to probe the wound at bedside and patient was noncompliant. Member of team returned to attempt to numb the area up for further investigation however patient adamantly refused. 3. Recommend IV antibiotics to be continued  4. Recommend warm compresses along the abscess area  5. Recommend ultrasound if patient agreeable  6. General surgery to sign off. Please return call if patient agreeable for further investigation and incision and drainage. Expressed concern that infection could spread to blood and bone, and patient verbalized understanding. Discussed with Dr. Arsenio Santillan MD, who is in agreement of the above. I attest I was present with the resident during the patient's examination and agree with the findings and plan as outlined above. Please call if patient is agreeable to surgical intervention. Will order heat in the meantime.   Ree Infante MD    Electronically signed by Hermila Arrington DO  on 8/29/2020 at 5:43 PM

## 2020-08-29 NOTE — PROGRESS NOTES
[facility] - Initial Consult Note  Today's Date and Time: 8/29/2020, 1:15 AM    Impression :   · Left thigh abscess with surrounding cellulitis. Most likely secondary to staph aureus  · Failed to respond to oral antibiotics. Unable to tolerate antibiotics by mouth    Recommendations:     · IV vancomycin until culture comes back-adjust to cefazolin if it is MSSA  · I&D of the abscess -cx SA pend      Medical Decision Making/Summary/Discussion:   ·   Infection Control Recommendations   · [place] Precautions  Antimicrobial Stewardship Recommendations   · Simplification of therapy  · Targeted therapy    Coordination of Outpatient Care:   · Estimated Length of IV antimicrobials: TBD  · Patient will need Midline Catheter Insertion: NO   · Patient will need PICC line Insertion: NO  · Patient will need: Home IV , [place],  SNF,  LTAC TBD  · Patient will need outpatient wound care: YES    Chief complaint/reason for consultation:   · Left thigh abscess with gram +cocci in pairs      History of Present Illness:   Kami Morales is a 20y.o.-year-old  female who was initially admitted on 8/28/2020. Patient seen at the request of Dr. Karina Lerma:    Patient with no significant medical history. Presents to the ER 8/28/2020 with persistent and worsening left thigh infection. Patient states that on August 7-2020 she noticed what was later diagnosed as an abscess. Presented to the ED at that time and the initial abscess was drained and she was treated with oral Bactrim and clindamycin. Patient reports that the antibiotics made her vomit, so she stopped taking them. Patient states she tried Zofran but did not help with her nausea. Patient states that the pain and swelling has increased, and has experienced fevers and chills even though she is not taking the antibiotics anymore.     On initial exam in the ED patient is in moderate distress and visible pain.   There is pain to palpation of the left thigh. There is a large area of skin soft tissue infection of the left thigh. An area approximately 10 x 12 cm is indurated erythematous and markedly tender with an incision site from I&D before I arrived Medial and more proximal to this wound is an area of induration and tenderness aproximately 1.5cm in diameter. Distal to this is another area of an open ulceration without active drainage approximately 1 cm in diameter. There is tenderness surrounding both of these areas deep into the thigh. Per ED resident, cultures have been obtained with gram-positive cocci in pairs noted on Gram stain. Patient has been placed on vancomycin and has been given fentanyl and Toradol for pain. Patient is still in significant discomfort. CURRENT EVALUATION: 08/29/20     Left thigh redness has improved, it was drained on 8/28 in    at this time cultures are showing staph aureus pending further ID. The thigh otherwise redness has improved and the induration is better, the swelling remains  General surgery on board      Left leg x-ray   8/28/2020: No gas present          Labs:    Creatinine:0.41  BUN:10    Hgb:12.0  Platelet:593  EZT:16.2    Cultures:  Urine:  ·   Blood:  · 8/28/20x2: collected and sent  Sputum :  ·   Wound:   8/28/20 Left leg abscess. SA  CSF         Discussed with patient, RN, family. I have personally reviewed the past medical history, past surgical history, medications, social history, and family history, and I have updated the database accordingly. Past Medical History:   History reviewed. No pertinent past medical history. Past Surgical  History:   History reviewed. No pertinent surgical history.     Medications:      sodium chloride flush  10 mL Intravenous 2 times per day    enoxaparin  40 mg Subcutaneous Daily    ampicillin-sulbactam  1.5 g Intravenous Q6H    vancomycin (VANCOCIN) intermittent dosing (placeholder)   Other RX Placeholder    vancomycin  1,000 mg Intravenous Q8H       Social History:     Social History     Socioeconomic History    Marital status: Single     Spouse name: Not on file    Number of children: Not on file    Years of education: Not on file    Highest education level: Not on file   Occupational History    Not on file   Social Needs    Financial resource strain: Not on file    Food insecurity     Worry: Not on file     Inability: Not on file    Transportation needs     Medical: Not on file     Non-medical: Not on file   Tobacco Use    Smoking status: Passive Smoke Exposure - Never Smoker    Smokeless tobacco: Never Used   Substance and Sexual Activity    Alcohol use: No    Drug use: No    Sexual activity: Yes     Partners: Male   Lifestyle    Physical activity     Days per week: Not on file     Minutes per session: Not on file    Stress: Not on file   Relationships    Social connections     Talks on phone: Not on file     Gets together: Not on file     Attends Gnosticism service: Not on file     Active member of club or organization: Not on file     Attends meetings of clubs or organizations: Not on file     Relationship status: Not on file    Intimate partner violence     Fear of current or ex partner: Not on file     Emotionally abused: Not on file     Physically abused: Not on file     Forced sexual activity: Not on file   Other Topics Concern    Not on file   Social History Narrative    Not on file       Family History:     Family History   Problem Relation Age of Onset    Asthma Brother     Heart Disease Maternal Grandmother     High Blood Pressure Maternal Grandmother     Cancer Maternal Grandfather     High Blood Pressure Maternal Grandfather     High Cholesterol Maternal Grandfather     Learning Disabilities Maternal Grandfather     Stroke Maternal Grandfather     Substance Abuse Maternal Grandfather     Learning Disabilities Maternal Aunt     Learning Disabilities Maternal Uncle     Substance Abuse Maternal Uncle     Arthritis Neg Hx     Birth Defects Neg Hx     Depression Neg Hx     Diabetes Neg Hx     Early Death Neg Hx     Hearing Loss Neg Hx     Kidney Disease Neg Hx     Mental Illness Neg Hx     Mental Retardation Neg Hx     Miscarriages / Stillbirths Neg Hx         Allergies:   Patient has no known allergies. Review of Systems:   Constitutional: complaints of Nausea/chills. no fever. . No systemic complaints  Head: No headaches  Eyes: No double vision or blurry vision. No conjunctival inflammation. ENT: No sore throat or runny nose. . No hearing loss, tinnitus or vertigo. Cardiovascular: No chest pain or palpitations. No shortness of breath. No VANESSA  Lung: No shortness of breath or cough. No sputum production  Abdomen: Nausea and vomiting, abdominal pain. No cramps. Genitourinary: No increased urinary frequency, or dysuria. No hematuria. No suprapubic or CVA pain  Musculoskeletal: No muscle aches or pains. No joint effusions, swelling or deformities pain to medial left thigh  Hematologic: No bleeding or bruising  Neurologic: No headache, weakness, numbness, or tingling. Integument: Pain and swelling the left side  Psychiatric: No depression. Endocrine: No polyuria, no polydipsia, no polyphagia. Physical Examination :     Patient Vitals for the past 8 hrs:   BP Temp Temp src Pulse Resp SpO2   08/28/20 1848 113/76 98.7 °F (37.1 °C) Oral 69 14 100 %     General Appearance: Awake, alert, and in no apparent distress  Head:  Normocephalic, no trauma  Eyes: Pupils equal, round, reactive to light and accommodation; extraocular movements intact; sclera anicteric; conjunctivae pink. No embolic phenomena. ENT: Oropharynx clear, without erythema, exudate, or thrush. No tenderness of sinuses. Mouth/throat: mucosa pink and moist. No lesions. Dentition in good repair. Neck:Supple, without lymphadenopathy. Thyroid normal, No bruits.   Pulmonary/Chest lungs are clear  Cardiovascular: Regular rate and rhythm without murmurs, rubs, or gallops. Abdomen: Soft, non tender no megaly  All four Extremities: No cyanosis, clubbing, edema, or effusions. Neurologic: No gross sensory or motor deficits. Skin: Open wound to left thigh, incision and drainage performed to the medial thigh. Abscess of left thigh    Medical Decision Making -Laboratory:   I have independently reviewed/ordered the following labs:    CBC with Differential:   Recent Labs     08/28/20  0818   WBC 12.2   HGB 12.0   HCT 37.2   *   LYMPHOPCT 16*   MONOPCT 5     BMP:   Recent Labs     08/28/20  0818      K 4.5      CO2 23   BUN 10   CREATININE 0.41*     Hepatic Function Panel: No results for input(s): PROT, LABALBU, BILIDIR, IBILI, BILITOT, ALKPHOS, ALT, AST in the last 72 hours. No results for input(s): RPR in the last 72 hours. No results for input(s): HIV in the last 72 hours. No results for input(s): BC in the last 72 hours. Lab Results   Component Value Date    RBC 4.30 08/28/2020    WBC 12.2 08/28/2020     Lab Results   Component Value Date    CREATININE 0.41 08/28/2020    GLUCOSE 95 08/28/2020       Medical Decision Making-Imaging:     EXAMINATION:    XRAY VIEWS OF THE LEFT FEMUR         8/28/2020 7:40 am         COMPARISON:    None.         HISTORY:    ORDERING SYSTEM PROVIDED HISTORY: Overlying cellulitis/abscess, please eval    for subcutaneous air    TECHNOLOGIST PROVIDED HISTORY:    Overlying cellulitis/abscess, please eval for subcutaneous air    Reason for Exam: overlying cellulitis/abscess, please eval for subcutaneous    air    Acuity: Unknown         FINDINGS:    Normal alignment at the hip and knee.  Normal mineralization of the femur. No acute bony abnormality.  Soft tissues show no radiopaque foreign body. About 17 cm above the knee there is small lucency in the subcutaneous fat    along the inner aspect of the thigh.  This could be artifact or soft tissue    gas.

## 2020-08-30 PROBLEM — B95.62 MRSA CELLULITIS: Status: ACTIVE | Noted: 2020-08-29

## 2020-08-30 PROBLEM — L03.90 MRSA CELLULITIS: Status: ACTIVE | Noted: 2020-08-29

## 2020-08-30 LAB
CULTURE: ABNORMAL
CULTURE: ABNORMAL
DIRECT EXAM: ABNORMAL
DIRECT EXAM: ABNORMAL
HCT VFR BLD CALC: 36.4 % (ref 36.3–47.1)
HEMOGLOBIN: 11.2 G/DL (ref 11.9–15.1)
Lab: ABNORMAL
MCH RBC QN AUTO: 27.3 PG (ref 25.2–33.5)
MCHC RBC AUTO-ENTMCNC: 30.8 G/DL (ref 28.4–34.8)
MCV RBC AUTO: 88.6 FL (ref 82.6–102.9)
NRBC AUTOMATED: 0 PER 100 WBC
PDW BLD-RTO: 15.3 % (ref 11.8–14.4)
PLATELET # BLD: 554 K/UL (ref 138–453)
PMV BLD AUTO: 9.5 FL (ref 8.1–13.5)
RBC # BLD: 4.11 M/UL (ref 3.95–5.11)
SPECIMEN DESCRIPTION: ABNORMAL
WBC # BLD: 9.8 K/UL (ref 4.5–13.5)

## 2020-08-30 PROCEDURE — 6370000000 HC RX 637 (ALT 250 FOR IP): Performed by: INTERNAL MEDICINE

## 2020-08-30 PROCEDURE — 6360000002 HC RX W HCPCS: Performed by: NURSE PRACTITIONER

## 2020-08-30 PROCEDURE — 6360000002 HC RX W HCPCS: Performed by: INTERNAL MEDICINE

## 2020-08-30 PROCEDURE — 1200000000 HC SEMI PRIVATE

## 2020-08-30 PROCEDURE — 99232 SBSQ HOSP IP/OBS MODERATE 35: CPT | Performed by: INTERNAL MEDICINE

## 2020-08-30 RX ADMIN — VANCOMYCIN HYDROCHLORIDE 1000 MG: 1 INJECTION, SOLUTION INTRAVENOUS at 16:02

## 2020-08-30 RX ADMIN — ONDANSETRON 4 MG: 2 INJECTION INTRAMUSCULAR; INTRAVENOUS at 12:46

## 2020-08-30 RX ADMIN — VANCOMYCIN HYDROCHLORIDE 1000 MG: 1 INJECTION, SOLUTION INTRAVENOUS at 23:00

## 2020-08-30 RX ADMIN — VANCOMYCIN HYDROCHLORIDE 1000 MG: 1 INJECTION, SOLUTION INTRAVENOUS at 06:55

## 2020-08-30 RX ADMIN — OXYCODONE HYDROCHLORIDE AND ACETAMINOPHEN 1 TABLET: 5; 325 TABLET ORAL at 07:44

## 2020-08-30 RX ADMIN — OXYCODONE HYDROCHLORIDE AND ACETAMINOPHEN 1 TABLET: 5; 325 TABLET ORAL at 17:40

## 2020-08-30 ASSESSMENT — ENCOUNTER SYMPTOMS
EYE ITCHING: 0
ANAL BLEEDING: 0
APNEA: 0
ABDOMINAL DISTENTION: 0
BACK PAIN: 0
EYE DISCHARGE: 0
EYE PAIN: 0
ABDOMINAL PAIN: 0
SINUS PAIN: 0
BLOOD IN STOOL: 0
STRIDOR: 0
VOICE CHANGE: 0
COLOR CHANGE: 0
WHEEZING: 0
SHORTNESS OF BREATH: 0
SINUS PRESSURE: 0
EYE REDNESS: 0
NAUSEA: 0
RECTAL PAIN: 0
PHOTOPHOBIA: 0
CONSTIPATION: 0
CHOKING: 0

## 2020-08-30 ASSESSMENT — PAIN DESCRIPTION - DESCRIPTORS: DESCRIPTORS: CRAMPING;CONSTANT

## 2020-08-30 ASSESSMENT — PAIN SCALES - GENERAL
PAINLEVEL_OUTOF10: 6
PAINLEVEL_OUTOF10: 4
PAINLEVEL_OUTOF10: 6
PAINLEVEL_OUTOF10: 3
PAINLEVEL_OUTOF10: 4

## 2020-08-30 ASSESSMENT — PAIN DESCRIPTION - PROGRESSION
CLINICAL_PROGRESSION: NOT CHANGED

## 2020-08-30 ASSESSMENT — PAIN DESCRIPTION - ORIENTATION: ORIENTATION: LEFT

## 2020-08-30 ASSESSMENT — PAIN DESCRIPTION - PAIN TYPE: TYPE: ACUTE PAIN

## 2020-08-30 ASSESSMENT — PAIN DESCRIPTION - FREQUENCY: FREQUENCY: CONTINUOUS

## 2020-08-30 ASSESSMENT — PAIN DESCRIPTION - LOCATION: LOCATION: LEG

## 2020-08-30 NOTE — PROGRESS NOTES
[facility]Med [facility]urg       Patient: Roberto Thrasher    Unit/Bed: 1025/0080-15    YOB: 2000    MRN: [id number]    Acct: [de-identified]     Admitting Diagnosis:Abscess of thigh [Z43.433]  Abscess of thigh [T31.513]    Admit Date:  8/28/2020    Hospital Day: 1    Subjective:    Patient not feeling well because of pain left side. Has periorbital edema. Patient Seen, Chart, Labs, Radiology studies, and Consults reviewed. Objective:   /71   Pulse 60   Temp 98.4 °F (36.9 °C) (Oral)   Resp 16   Ht 5' 3\" (1.6 m)   Wt 150 lb (68 kg)   LMP 07/15/2020   SpO2 100%   BMI 26.57 kg/m²       Intake/Output Summary (Last 24 hours) at 8/29/2020 2154  Last data filed at 8/29/2020 8296  Gross per 24 hour   Intake 1195 ml   Output --   Net 1195 ml       Review of Systems   Constitutional: Positive for activity change. Negative for appetite change, diaphoresis, fatigue and unexpected weight change. HENT: Negative for dental problem, ear discharge, ear pain, hearing loss, mouth sores, nosebleeds, postnasal drip, sinus pressure, sinus pain, sneezing, tinnitus and voice change. Eyes: Negative for photophobia, pain, discharge, redness, itching and visual disturbance. Bilateral periorbital swelling   Respiratory: Negative for apnea, choking, shortness of breath, wheezing and stridor. Cardiovascular: Negative for chest pain and palpitations. Gastrointestinal: Negative for abdominal distention, abdominal pain, anal bleeding, blood in stool, constipation, nausea and rectal pain. Genitourinary: Negative for decreased urine volume, difficulty urinating, dyspareunia, dysuria, flank pain, frequency, genital sores, menstrual problem, pelvic pain, urgency and vaginal bleeding.    Musculoskeletal: Negative for arthralgias, back pain, gait problem, joint swelling and neck pain.   Skin: Negative for color change, pallor, rash and wound. Neurological: Negative for dizziness, tremors, facial asymmetry, speech difficulty, weakness, light-headedness, numbness and headaches. Hematological: Negative for adenopathy. Does not bruise/bleed easily. Psychiatric/Behavioral: Negative for agitation, behavioral problems, dysphoric mood, hallucinations, self-injury and suicidal ideas. The patient is not hyperactive. Physical Exam  Vitals signs and nursing note reviewed. Constitutional:       General: She is not in acute distress. Appearance: Normal appearance. She is obese. She is not ill-appearing, toxic-appearing or diaphoretic. HENT:      Head: Normocephalic and atraumatic. Nose: Nose normal. No congestion or rhinorrhea. Mouth/Throat:      Mouth: Mucous membranes are moist.      Pharynx: Oropharynx is clear. No oropharyngeal exudate or posterior oropharyngeal erythema. Eyes:      General: No scleral icterus. Right eye: No discharge. Left eye: No discharge. Extraocular Movements: Extraocular movements intact. Conjunctiva/sclera: Conjunctivae normal.      Pupils: Pupils are equal, round, and reactive to light. Comments: Bilateral periorbital edema   Neck:      Musculoskeletal: Neck supple. No neck rigidity. Vascular: No carotid bruit. Cardiovascular:      Rate and Rhythm: Normal rate and regular rhythm. Pulses: Normal pulses. Heart sounds: Normal heart sounds. No murmur. No friction rub. No gallop. Pulmonary:      Effort: Pulmonary effort is normal. No respiratory distress. Breath sounds: Normal breath sounds. No stridor. No wheezing or rales. Chest:      Chest wall: No tenderness. Abdominal:      General: Abdomen is flat. Bowel sounds are normal. There is no distension. Palpations: Abdomen is soft. There is no mass. Tenderness: There is no abdominal tenderness.  There is no right CVA tenderness, 08/29/20  1920    139   K 4.5 3.7    109*   CO2 23 21   BUN 10 6   CREATININE 0.41* 0.49*     Results for Gricelda Oro (MRN [id number]) as of 8/28/2020 12:53    Ref. Range 8/28/2020 08:18   CRP Latest Ref Range: 0.0 - 5.0 mg/L 35.4 (H)      ABG. None.     URINALYSIS. None.     SEROLOGY  Results for Gricelda Oro (MRN [id number]) as of 8/28/2020 16:15    Ref. Range 8/28/2020 08:18   Hepatitis C Ab Latest Ref Range: NONREACTIVE  NONREACTIVE      TUMOR MARKERS. None.     MICROBIOLOGY   Abscess Gram stain-08/28/2020. STAPHYLOCOCCUS AUREUS LIGHT GROWTH.     HISTOPATHOLOGY. None.     TOXICOLOGY. None.     ENDOSCOPE STUDIES. None.     PROCEDURES. None.     RADIOLOGY. Left femur x-ray-08/28/2020.     FINDINGS:  Normal alignment at the hip and knee.       Normal mineralization of the femur.      No acute bony abnormality.       Soft tissues show no radiopaque foreign body.     About 17 cm above the knee there is small lucency in the subcutaneous fat  along the inner aspect of the thigh.       This could be artifact or soft tissue gas.     IMPRESSION:  1. No acute osseous abnormality.     2. Small subcutaneous fat lucency along the inner aspect of the left thigh      about 17 cm above knee.           This could be soft tissue gas or artifact.     ASSESSMENT AND  PLAN.     1. ID. 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 General surgery consult for I&D     ID consult.     2. PSYCH. Suspected anxiety/depression on Prozac.     3. LIFE STYLE. Chronic cannabis use.       4.HEM-ONC. Elevated CRP 35.4. Reactive thrombocytosis due to infection-platelets 006.     5.DISPO. Planned d/c to home soon.       Electronically signed Lisseth Rosario MD on 8/29/2020 at 9:54 PM    Rounding Hospitalist

## 2020-08-30 NOTE — PROGRESS NOTES
[facility]       Patient: Fredrick Memory    Unit/Bed: 8792/7117-05    YOB: 2000    MRN: [id number]    Acct: [de-identified]     Admitting Diagnosis:Abscess of thigh [J17.436]  Abscess of thigh [Q91.197]    Admit Date:  8/28/2020    Hospital Day: 2    Subjective:    Patient is feeling better. Reduce swelling and pain left thigh. Improved periorbital edema after IV Lasix and reduction IVF infusion. Patient Seen, Chart, Labs, Radiology studies, and Consults reviewed. Objective:   /76   Pulse 68   Temp 98.6 °F (37 °C) (Oral)   Resp 16   Ht 5' 3\" (1.6 m)   Wt 150 lb (68 kg)   LMP 07/15/2020   SpO2 99%   BMI 26.57 kg/m²     No intake or output data in the 24 hours ending 08/30/20 1636    Review of Systems   Constitutional: Positive for activity change. Negative for appetite change, diaphoresis, fatigue and unexpected weight change. HENT: Negative for dental problem, ear discharge, ear pain, hearing loss, mouth sores, nosebleeds, postnasal drip, sinus pressure, sinus pain, sneezing, tinnitus and voice change. Eyes: Negative for photophobia, pain, discharge, redness, itching and visual disturbance. Bilateral periorbital swelling   Respiratory: Negative for apnea, choking, shortness of breath, wheezing and stridor. Cardiovascular: Negative for chest pain and palpitations. Gastrointestinal: Negative for abdominal distention, abdominal pain, anal bleeding, blood in stool, constipation, nausea and rectal pain. Genitourinary: Negative for decreased urine volume, difficulty urinating, dyspareunia, dysuria, flank pain, frequency, genital sores, menstrual problem, pelvic pain, urgency and vaginal bleeding. Musculoskeletal: Negative for arthralgias, back pain, gait problem, joint swelling and neck pain.    Skin: Negative for color change, pallor, rash and wound. Neurological: Negative for dizziness, tremors, facial asymmetry, speech difficulty, weakness, light-headedness, numbness and headaches. Hematological: Negative for adenopathy. Does not bruise/bleed easily. Psychiatric/Behavioral: Negative for agitation, behavioral problems, dysphoric mood, hallucinations, self-injury and suicidal ideas. The patient is not hyperactive. Physical Exam  Vitals signs and nursing note reviewed. Constitutional:       General: She is not in acute distress. Appearance: Normal appearance. She is obese. She is not ill-appearing, toxic-appearing or diaphoretic. HENT:      Head: Normocephalic and atraumatic. Nose: Nose normal. No congestion or rhinorrhea. Mouth/Throat:      Mouth: Mucous membranes are moist.      Pharynx: Oropharynx is clear. No oropharyngeal exudate or posterior oropharyngeal erythema. Eyes:      General: No scleral icterus. Right eye: No discharge. Left eye: No discharge. Extraocular Movements: Extraocular movements intact. Conjunctiva/sclera: Conjunctivae normal.      Pupils: Pupils are equal, round, and reactive to light. Comments: Bilateral periorbital edema   Neck:      Musculoskeletal: Neck supple. No neck rigidity. Vascular: No carotid bruit. Cardiovascular:      Rate and Rhythm: Normal rate and regular rhythm. Pulses: Normal pulses. Heart sounds: Normal heart sounds. No murmur. No friction rub. No gallop. Pulmonary:      Effort: Pulmonary effort is normal. No respiratory distress. Breath sounds: Normal breath sounds. No stridor. No wheezing or rales. Chest:      Chest wall: No tenderness. Abdominal:      General: Abdomen is flat. Bowel sounds are normal. There is no distension. Palpations: Abdomen is soft. There is no mass. Tenderness: There is no abdominal tenderness. There is no right CVA tenderness, left CVA tenderness, guarding or rebound. Hernia: No hernia is present. Musculoskeletal: Normal range of motion. General: Swelling and tenderness present. No deformity or signs of injury. Left lower leg: No edema. Comments: Left medial thigh swelling tenderness and open necrotic wound    Lymphadenopathy:      Cervical: No cervical adenopathy. Skin:     General: Skin is warm. Coloration: Skin is not jaundiced or pale. Findings: No bruising, erythema, lesion or rash. Neurological:      General: No focal deficit present. Mental Status: She is alert and oriented to person, place, and time. Mental status is at baseline. Cranial Nerves: No cranial nerve deficit. Sensory: No sensory deficit. Motor: No weakness. Coordination: Coordination normal.      Deep Tendon Reflexes: Reflexes normal.   Psychiatric:         Mood and Affect: Mood normal.         Behavior: Behavior normal.         Thought Content:  Thought content normal.         Judgment: Judgment normal.       Medications:    lidocaine PF  5 mL Intradermal Once    lidocaine-EPINEPHrine  20 mL Intradermal Once    sodium chloride flush  10 mL Intravenous 2 times per day    enoxaparin  40 mg Subcutaneous Daily    vancomycin (VANCOCIN) intermittent dosing (placeholder)   Other RX Placeholder    vancomycin  1,000 mg Intravenous Q8H       Continuous Infusions:   sodium chloride 50 mL/hr at 08/29/20 2220       PRN Meds:sodium chloride flush, potassium chloride **OR** potassium alternative oral replacement **OR** potassium chloride, magnesium sulfate, acetaminophen **OR** [DISCONTINUED] acetaminophen, polyethylene glycol, promethazine **OR** ondansetron, oxyCODONE-acetaminophen, diphenhydrAMINE    Data:    CBC:   Recent Labs     08/28/20  0818 08/29/20  1920   WBC 12.2 9.8   RBC 4.30 4.11   HGB 12.0 11.2*   HCT 37.2 36.4   MCV 86.5 88.6   RDW 15.6* 15.3*   * 554*       BMP:   Recent Labs     08/28/20  0818 08/29/20  1920    139   K 4.5 3.7  109*   CO2 23 21   BUN 10 6   CREATININE 0.41* 0.49*     Results for Harvey Sal (MRN [id number]) as of 8/28/2020 12:53    Ref. Range 8/28/2020 08:18   CRP Latest Ref Range: 0.0 - 5.0 mg/L 35.4 (H)      ABG. None.     URINALYSIS. None.     SEROLOGY  Results for Harvey Sal (MRN [id number]) as of 8/28/2020 16:15    Ref. Range 8/28/2020 08:18   Hepatitis C Ab Latest Ref Range: NONREACTIVE  NONREACTIVE      TUMOR MARKERS. None.     MICROBIOLOGY   Abscess Gram stain-08/28/2020. METHICILLIN RESISTANT STAPHYLOCOCCUS AUREUS LIGHT GROWTH      HISTOPATHOLOGY. None.     TOXICOLOGY. None.     ENDOSCOPE STUDIES. None.     PROCEDURES. None.     RADIOLOGY. Left femur x-ray-08/28/2020.     FINDINGS:  Normal alignment at the hip and knee.       Normal mineralization of the femur.      No acute bony abnormality.       Soft tissues show no radiopaque foreign body.     About 17 cm above the knee there is small lucency in the subcutaneous fat  along the inner aspect of the thigh.       This could be artifact or soft tissue gas.     IMPRESSION:  1. No acute osseous abnormality.     2. Small subcutaneous fat lucency along the inner aspect of the left thigh      about 17 cm above knee.           This could be soft tissue gas or artifact.     ASSESSMENT AND  PLAN.     1. ID. 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 Continue IV vancomycin      General surgery consult for I&D     ID following.     2. PSYCH. Suspected anxiety/depression on Prozac.     3. LIFE STYLE. Chronic cannabis use.       4.HEM-ONC. Elevated CRP 35.4. Reactive thrombocytosis due to infection-platelets 154.     5.DISPO. Planned d/c to home soon.       Electronically signed Stanfrod Greenberg MD on 8/30/2020 at 4:36 PM    Rounding Hospitalist

## 2020-08-31 VITALS
DIASTOLIC BLOOD PRESSURE: 74 MMHG | OXYGEN SATURATION: 99 % | SYSTOLIC BLOOD PRESSURE: 115 MMHG | HEART RATE: 73 BPM | RESPIRATION RATE: 14 BRPM | BODY MASS INDEX: 26.79 KG/M2 | HEIGHT: 63 IN | TEMPERATURE: 98.5 F | WEIGHT: 151.2 LBS

## 2020-08-31 LAB
ANION GAP SERPL CALCULATED.3IONS-SCNC: 12 MMOL/L (ref 9–17)
BUN BLDV-MCNC: 4 MG/DL (ref 6–20)
BUN/CREAT BLD: ABNORMAL (ref 9–20)
CALCIUM SERPL-MCNC: 9.5 MG/DL (ref 8.6–10.4)
CHLORIDE BLD-SCNC: 107 MMOL/L (ref 98–107)
CO2: 20 MMOL/L (ref 20–31)
CREAT SERPL-MCNC: 0.57 MG/DL (ref 0.5–0.9)
GFR AFRICAN AMERICAN: >60 ML/MIN
GFR NON-AFRICAN AMERICAN: >60 ML/MIN
GFR SERPL CREATININE-BSD FRML MDRD: ABNORMAL ML/MIN/{1.73_M2}
GFR SERPL CREATININE-BSD FRML MDRD: ABNORMAL ML/MIN/{1.73_M2}
GLUCOSE BLD-MCNC: 93 MG/DL (ref 70–99)
POTASSIUM SERPL-SCNC: 3.8 MMOL/L (ref 3.7–5.3)
SODIUM BLD-SCNC: 139 MMOL/L (ref 135–144)

## 2020-08-31 PROCEDURE — 36415 COLL VENOUS BLD VENIPUNCTURE: CPT

## 2020-08-31 PROCEDURE — 80048 BASIC METABOLIC PNL TOTAL CA: CPT

## 2020-08-31 PROCEDURE — 6360000002 HC RX W HCPCS: Performed by: INTERNAL MEDICINE

## 2020-08-31 PROCEDURE — 2580000003 HC RX 258: Performed by: INTERNAL MEDICINE

## 2020-08-31 PROCEDURE — 99239 HOSP IP/OBS DSCHRG MGMT >30: CPT | Performed by: INTERNAL MEDICINE

## 2020-08-31 PROCEDURE — 99232 SBSQ HOSP IP/OBS MODERATE 35: CPT | Performed by: INTERNAL MEDICINE

## 2020-08-31 RX ORDER — DOXYCYCLINE HYCLATE 100 MG
100 TABLET ORAL 2 TIMES DAILY
Qty: 28 TABLET | Refills: 0 | Status: SHIPPED | OUTPATIENT
Start: 2020-08-31 | End: 2020-09-14

## 2020-08-31 RX ORDER — PROMETHAZINE HYDROCHLORIDE 12.5 MG/1
12.5 TABLET ORAL EVERY 6 HOURS PRN
Qty: 21 TABLET | Refills: 0 | Status: SHIPPED | OUTPATIENT
Start: 2020-08-31 | End: 2020-09-07

## 2020-08-31 RX ORDER — DOXYCYCLINE HYCLATE 100 MG
100 TABLET ORAL 2 TIMES DAILY
Qty: 28 TABLET | Refills: 0 | Status: SHIPPED | OUTPATIENT
Start: 2020-08-31 | End: 2020-08-31 | Stop reason: HOSPADM

## 2020-08-31 RX ADMIN — VANCOMYCIN HYDROCHLORIDE 1000 MG: 1 INJECTION, SOLUTION INTRAVENOUS at 16:54

## 2020-08-31 RX ADMIN — VANCOMYCIN HYDROCHLORIDE 1000 MG: 1 INJECTION, SOLUTION INTRAVENOUS at 07:10

## 2020-08-31 RX ADMIN — SODIUM CHLORIDE: 9 INJECTION, SOLUTION INTRAVENOUS at 16:54

## 2020-08-31 ASSESSMENT — PAIN DESCRIPTION - PROGRESSION
CLINICAL_PROGRESSION: NOT CHANGED

## 2020-08-31 NOTE — PLAN OF CARE
Problem: Pain:  Goal: Pain level will decrease  Description: Pain level will decrease  8/31/2020 1758 by Juan Luke RN  Outcome: Ongoing  8/31/2020 0500 by Kali Moreno RN  Outcome: Ongoing  Goal: Control of acute pain  Description: Control of acute pain  8/31/2020 1758 by uJan Luke RN  Outcome: Ongoing  8/31/2020 0500 by Kali Moreno RN  Outcome: Ongoing  Goal: Control of chronic pain  Description: Control of chronic pain  8/31/2020 1758 by Juan Luke RN  Outcome: Ongoing  8/31/2020 0500 by Kali Moreno RN  Outcome: Ongoing

## 2020-08-31 NOTE — PLAN OF CARE
Patient wheeled down to lobby via wheelchair bu [address] [place] [address]. She took all of her belongings. Discharge instructions were reviewed and all questions answered. Family member transported her home.

## 2020-08-31 NOTE — DISCHARGE SUMMARY
[facility] Discharge Summary-Internal Medicine. Patient Identification:   Theresa Chase   : 2000  MRN: [id number]   Account: [de-identified]      Patient's PCP: Ryland Galvan MD    Admit Date: 2020     Discharge Date:   20    Admitting Physician: Shaheen Carl MD     Discharge Physician: Shaheen Carl MD     Discharge Diagnoses: Active Hospital Problems    Diagnosis Date Noted    MRSA cellulitis [L03.90, B95.62] 2020    Abscess of thigh [L02.419] 2020    Recurrent cellulitis of thigh [L03.119] 2020    Cannabis abuse without complication [Z77.09]     CRP elevated [R79.82] 2020    Reactive thrombocytosis [R79.89] 2020       The patient was seen and examined on day of discharge and this discharge summary is in conjunction with any daily progress note from day of discharge. Hospital Course:   Theresa Chase is a 20 y.o. female admitted to [facility] on 2020 for acute recurrent left medial thigh pain due to abscess and cellulitis. Wound cultures positive for MRSA. Initially treated with IV Zosyn and vancomycin. Seen by ID. Cleared for discharge to home on doxycycline 100 mg twice a day x14 days. Stable for discharge. Follow-up with PCP in 1 week. HPI and Physical Exam:    19-year-old female patient presented to the ED today with recurrent left inner thigh pain and swelling due to multiple abscesses. Status post I&D in the ED. First episode of left inner thigh abscess was on 2020. Patient was seen in the ED and discharged on antibiotics. The patient and her mother thinks she was sent home on Bactrim which she completed. However, her home medication shows Keflex and clindamycin.       Presenting today with severe pain and swelling for more than 10/10 intensity. No history of fever or chills.   Pain is exacerbated by touch and ambulation.       Denies any history of IV drug abuse no history of diabetes. Patient is sexually active and is agreeable to HIV screening.     Vital signs on admission temperature 98.6 °F, respiratory 16, heart rate 77, blood pressure 121/78, congestion 100% on room air.     Notable labs in the ED CRP 35.4, WBC 12. 2.     Left femur x-ray which reviewed is negative for fracture or tissue gas formation.     ED treatment included I&D for the abscess, IV vancomycin 1 g and Unasyn, tetanus shot, IV morphine 2 mg for pain control, IV fentanyl, IV Toradol and Tylenol. Vitals:  Vitals:    08/30/20 1121 08/30/20 1945 08/31/20 0600 08/31/20 0923   BP: 109/76 113/70  115/74   Pulse: 68 62  73   Resp: 16 16  14   Temp: 98.6 °F (37 °C) 98.7 °F (37.1 °C)  98.5 °F (36.9 °C)   TempSrc: Oral Oral  Oral   SpO2: 99% 100%  99%   Weight:   151 lb 3.2 oz (68.6 kg)    Height:         Weight: Weight: 151 lb 3.2 oz (68.6 kg)     24 hour intake/output:    Intake/Output Summary (Last 24 hours) at 8/31/2020 2006  Last data filed at 8/31/2020 0459  Gross per 24 hour   Intake 600 ml   Output --   Net 600 ml     Physical Exam  Vitals signs and nursing note reviewed. Constitutional:       General: She is not in acute distress. Appearance: Normal appearance. She is normal weight. She is not ill-appearing, toxic-appearing or diaphoretic. HENT:      Head: Normocephalic and atraumatic. Nose: Nose normal. No congestion or rhinorrhea. Mouth/Throat:      Mouth: Mucous membranes are moist.      Pharynx: Oropharynx is clear. No oropharyngeal exudate or posterior oropharyngeal erythema. Eyes:      General: No scleral icterus. Right eye: No discharge. Left eye: No discharge. Extraocular Movements: Extraocular movements intact. Conjunctiva/sclera: Conjunctivae normal.      Pupils: Pupils are equal, round, and reactive to light. Neck:      Musculoskeletal: Normal range of motion and neck supple.  No neck rigidity or muscular tenderness. Vascular: No carotid bruit. Cardiovascular:      Rate and Rhythm: Normal rate and regular rhythm. Pulses: Normal pulses. Heart sounds: Normal heart sounds. No murmur. No friction rub. No gallop. Pulmonary:      Effort: Pulmonary effort is normal. No respiratory distress. Breath sounds: Normal breath sounds. No stridor. No wheezing, rhonchi or rales. Chest:      Chest wall: No tenderness. Abdominal:      General: Abdomen is flat. Bowel sounds are normal. There is no distension. Palpations: Abdomen is soft. There is no mass. Tenderness: There is no abdominal tenderness. There is no right CVA tenderness, left CVA tenderness, guarding or rebound. Hernia: No hernia is present. Musculoskeletal: Normal range of motion. General: Swelling and tenderness present. No deformity. Right lower leg: No edema. Left lower leg: No edema. Comments: Left medial thigh wound abscess, reduce swelling and tenderness. Lymphadenopathy:      Cervical: No cervical adenopathy. Skin:     General: Skin is warm and dry. Coloration: Skin is not jaundiced or pale. Findings: No bruising, erythema, lesion or rash. Neurological:      General: No focal deficit present. Mental Status: She is alert and oriented to person, place, and time. Mental status is at baseline. Cranial Nerves: No cranial nerve deficit. Sensory: No sensory deficit. Motor: No weakness. Coordination: Coordination normal.      Gait: Gait normal.      Deep Tendon Reflexes: Reflexes normal.   Psychiatric:         Mood and Affect: Mood normal.         Behavior: Behavior normal.         Thought Content:  Thought content normal.         Judgment: Judgment normal.     CBC:    Lab Results   Component Value Date    WBC 9.8 08/29/2020    HGB 11.2 08/29/2020    HCT 36.4 08/29/2020     08/29/2020       Renal:    Lab Results   Component Value Date  08/31/2020    K 3.8 08/31/2020     08/31/2020    CO2 20 08/31/2020    BUN 4 08/31/2020    CREATININE 0.57 08/31/2020    CALCIUM 9.5 08/31/2020     Results for Matt Ray (MRN [id number]) as of 8/28/2020 12:53    Ref. Range 8/28/2020 08:18   CRP Latest Ref Range: 0.0 - 5.0 mg/L 35.4 (H)      ABG. None.     URINALYSIS. None.     SEROLOGY     Results for Matt Ray (MRN [id number]) as of 8/31/2020 19:57   Ref. Range 8/28/2020 08:18   HIV Ag/Ab Latest Ref Range: NONREACTIVE  NONREACTIVE     Results for Matt Ray (MRN [id number]) as of 8/28/2020 16:15    Ref. Range 8/28/2020 08:18   Hepatitis C Ab Latest Ref Range: NONREACTIVE  NONREACTIVE      TUMOR MARKERS. None.     MICROBIOLOGY   Abscess Gram stain-08/28/2020. METHICILLIN RESISTANT STAPHYLOCOCCUS AUREUS LIGHT GROWTH      HISTOPATHOLOGY. None.     TOXICOLOGY. None.     ENDOSCOPE STUDIES. None.     PROCEDURES. None.     RADIOLOGY. Left femur x-ray-08/28/2020.     FINDINGS:  Normal alignment at the hip and knee.       Normal mineralization of the femur.      No acute bony abnormality.       Soft tissues show no radiopaque foreign body.     About 17 cm above the knee there is small lucency in the subcutaneous fat  along the inner aspect of the thigh.       This could be artifact or soft tissue gas.     IMPRESSION:  1. No acute osseous abnormality.     2. Small subcutaneous fat lucency along the inner aspect of the left thigh      about 17 cm above knee.           This could be soft tissue gas or artifact    Consults:     IP CONSULT TO INFECTIOUS DISEASES  IP CONSULT TO INTERNAL MEDICINE  PHARMACY TO DOSE VANCOMYCIN  IP CONSULT TO INFECTIOUS DISEASES  IP CONSULT TO GENERAL SURGERY    Disposition:    [x] Home       [] TCU       [] Rehab       [] Psych       [] SNF       [] [place]       [] Other-    Condition at Discharge: Stable    Code Status:  Full Code     Patient Instructions:    Discharge lab work:    Activity: activity as

## 2020-08-31 NOTE — PROGRESS NOTES
[facility] - Progress Note    Today's Date and Time: 8/31/2020, 9:43 AM    Impression :   · Left thigh abscess with surrounding cellulitis. Most likely secondary to staph aureus  · Failed to respond to oral antibiotics. Unable to tolerate antibiotics by mouth    Recommendations:     · D/C IV vancomycin   · OK to discharge on Doxycycline 100 mg po q 12 Hr . Stop date 9-14-20  · Wound care at home    Medical Decision Making/Summary/Discussion:   ·   Infection Control Recommendations   · [place] Precautions  Antimicrobial Stewardship Recommendations   · Simplification of therapy  · Targeted therapy    Coordination of Outpatient Care:   · Estimated Length of IV antimicrobials: TBD  · Patient will need Midline Catheter Insertion: NO   · Patient will need PICC line Insertion: NO  · Patient will need: Home IV , [place],  SNF,  LTAC TBD  · Patient will need outpatient wound care: YES    Chief complaint/reason for consultation:   · Left thigh abscess with gram +cocci in pairs      History of Present Illness:   Tangela Price is a 20y.o.-year-old  female who was initially admitted on 8/28/2020. Patient seen at the request of Dr. Shara Rawls:    Patient with no significant medical history. Presents to the ER 8/28/2020 with persistent and worsening left thigh infection. Patient states that on August 7-2020 she noticed what was later diagnosed as an abscess. Presented to the ED at that time and the initial abscess was drained and she was treated with oral Bactrim and clindamycin. Patient reports that the antibiotics made her vomit, so she stopped taking them. Patient states she tried Zofran but did not help with her nausea. Patient states that the pain and swelling has increased, and has experienced fevers and chills even though she is not taking the antibiotics anymore.     On initial exam in the ED patient is in moderate distress and visible pain.   There is pain to palpation of the left thigh. There is a large area of skin soft tissue infection of the left thigh. An area approximately 10 x 12 cm is indurated erythematous and markedly tender with an incision site from I&D before I arrived Medial and more proximal to this wound is an area of induration and tenderness aproximately 1.5cm in diameter. Distal to this is another area of an open ulceration without active drainage approximately 1 cm in diameter. There is tenderness surrounding both of these areas deep into the thigh. Per ED resident, cultures have been obtained with gram-positive cocci in pairs noted on Gram stain. Patient has been placed on vancomycin and has been given fentanyl and Toradol for pain. Patient is still in significant discomfort. CURRENT EVALUATION: 08/31/20   Afebrile  VS stable  Admits to some nausea, although attributes this to eating too much fruit. Left thigh redness has improved, it was drained on 8/28. Patient reports a significant decrease in tenderness. General surgery on board    Left leg images:  8/31/20      Left leg x-ray   8/28/2020: No gas present          Labs:    Creatinine:0.41>0.49  BUN:10>6    Hgb:12.0>11.2  Platelet:593>554  SYH:39.8>2.5    Cultures:  Urine:  ·   Blood:  · 8/28/20x2: no growth 3 days  Sputum :  ·   Wound:   8/28/20 Left leg abscess. MRSA, sensitive to bactrim, doxycyline and vancomycin  CSF         Discussed with patient, RN, family. I have personally reviewed the past medical history, past surgical history, medications, social history, and family history, and I have updated the database accordingly. Past Medical History:   History reviewed. No pertinent past medical history. Past Surgical  History:   History reviewed. No pertinent surgical history.     Medications:      lidocaine PF  5 mL Intradermal Once    lidocaine-EPINEPHrine  20 mL Intradermal Once    sodium chloride flush  10 mL Intravenous 2 times per day  enoxaparin  40 mg Subcutaneous Daily    vancomycin (VANCOCIN) intermittent dosing (placeholder)   Other RX Placeholder    vancomycin  1,000 mg Intravenous Q8H       Social History:     Social History     Socioeconomic History    Marital status: Single     Spouse name: Not on file    Number of children: Not on file    Years of education: Not on file    Highest education level: Not on file   Occupational History    Not on file   Social Needs    Financial resource strain: Not on file    Food insecurity     Worry: Not on file     Inability: Not on file   [employer] needs     Medical: Not on file     Non-medical: Not on file   Tobacco Use    Smoking status: Passive Smoke Exposure - Never Smoker    Smokeless tobacco: Never Used   Substance and Sexual Activity    Alcohol use: No    Drug use: No    Sexual activity: Yes     Partners: Male   Lifestyle    Physical activity     Days per week: Not on file     Minutes per session: Not on file    Stress: Not on file   Relationships    Social connections     Talks on phone: Not on file     Gets together: Not on file     Attends [employer] service: Not on file     Active member of club or organization: Not on file     Attends meetings of clubs or organizations: Not on file     Relationship status: Not on file    Intimate partner violence     Fear of current or ex partner: Not on file     Emotionally abused: Not on file     Physically abused: Not on file     Forced sexual activity: Not on file   Other Topics Concern    Not on file   Social History Narrative    Not on file       Family History:     Family History   Problem Relation Age of Onset    Asthma Brother     Heart Disease Maternal Grandmother     High Blood Pressure Maternal Grandmother     Cancer Maternal Grandfather     High Blood Pressure Maternal Grandfather     High Cholesterol Maternal Grandfather     Learning Disabilities Maternal Grandfather     Stroke Maternal Grandfather     knee there is small lucency in the subcutaneous fat    along the inner aspect of the thigh.  This could be artifact or soft tissue    gas.              Impression    1. No acute osseous abnormality. 2. Small subcutaneous fat lucency along the inner aspect of the left thigh    about 17 cm above knee.  This could be soft tissue gas or artifact.               Medical Decision Making-Other: Thank you for allowing us to participate in the care of this patient. Please call with questions. Jaimee Obrien DPM     ATTESTATION:    I have discussed the case, including pertinent history and exam findings with the residents and students. I have seen and examined the patient and the key elements of the encounter have been performed by me. I was present when the student obtained his information or examined the patient. I have reviewed the laboratory data, other diagnostic studies and discussed them with the residents. I have updated the medical record where necessary. I agree with the assessment, plan and orders as documented by the resident/ student.     Renita Kunz MD.

## 2020-09-02 LAB
CULTURE: ABNORMAL
DIRECT EXAM: ABNORMAL
DIRECT EXAM: ABNORMAL
Lab: ABNORMAL
SPECIMEN DESCRIPTION: ABNORMAL

## 2020-09-03 LAB
CULTURE: NORMAL
CULTURE: NORMAL
Lab: NORMAL
Lab: NORMAL
SPECIMEN DESCRIPTION: NORMAL
SPECIMEN DESCRIPTION: NORMAL

## 2022-03-30 ENCOUNTER — OFFICE VISIT (OUTPATIENT)
Dept: PRIMARY CARE CLINIC | Age: 22
End: 2022-03-30
Payer: COMMERCIAL

## 2022-03-30 ENCOUNTER — HOSPITAL ENCOUNTER (OUTPATIENT)
Age: 22
Setting detail: SPECIMEN
Discharge: HOME | End: 2022-03-30

## 2022-03-30 VITALS
HEART RATE: 78 BPM | HEIGHT: 61 IN | WEIGHT: 150 LBS | BODY MASS INDEX: 28.32 KG/M2 | DIASTOLIC BLOOD PRESSURE: 79 MMHG | OXYGEN SATURATION: 97 % | TEMPERATURE: 97.3 F | SYSTOLIC BLOOD PRESSURE: 115 MMHG

## 2022-03-30 DIAGNOSIS — Z86.19 HX OF CHLAMYDIA INFECTION: ICD-10-CM

## 2022-03-30 DIAGNOSIS — Z32.00 ENCOUNTER FOR PREGNANCY TEST, RESULT UNKNOWN: ICD-10-CM

## 2022-03-30 DIAGNOSIS — N89.8 VAGINAL DISCHARGE: ICD-10-CM

## 2022-03-30 DIAGNOSIS — N89.8 VAGINAL DISCHARGE: Primary | ICD-10-CM

## 2022-03-30 LAB
BILIRUBIN, POC: ABNORMAL
BLOOD URINE, POC: ABNORMAL
CANDIDA SPECIES, DNA PROBE: NEGATIVE
CLARITY, POC: ABNORMAL
COLOR, POC: YELLOW
CONTROL: ABNORMAL
GARDNERELLA VAGINALIS, DNA PROBE: POSITIVE
GLUCOSE URINE, POC: ABNORMAL
HCG QUANTITATIVE: <1 MIU/ML
KETONES, POC: ABNORMAL
LEUKOCYTE EST, POC: ABNORMAL
NITRITE, POC: ABNORMAL
PH, POC: 6.5
PREGNANCY TEST URINE, POC: ABNORMAL
PROTEIN, POC: ABNORMAL
SOURCE: ABNORMAL
SPECIFIC GRAVITY, POC: 1.02
TRICHOMONAS VAGINALIS DNA: POSITIVE
UROBILINOGEN, POC: 1

## 2022-03-30 PROCEDURE — 99213 OFFICE O/P EST LOW 20 MIN: CPT

## 2022-03-30 PROCEDURE — 81025 URINE PREGNANCY TEST: CPT

## 2022-03-30 PROCEDURE — 81003 URINALYSIS AUTO W/O SCOPE: CPT

## 2022-03-30 PROCEDURE — 96372 THER/PROPH/DIAG INJ SC/IM: CPT

## 2022-03-30 RX ORDER — AZITHROMYCIN 500 MG/1
1000 TABLET, FILM COATED ORAL ONCE
Qty: 2 TABLET | Refills: 0 | Status: SHIPPED | OUTPATIENT
Start: 2022-03-30 | End: 2022-03-30

## 2022-03-30 RX ORDER — CEFTRIAXONE 500 MG/1
500 INJECTION, POWDER, FOR SOLUTION INTRAMUSCULAR; INTRAVENOUS ONCE
Status: COMPLETED | OUTPATIENT
Start: 2022-03-30 | End: 2022-03-30

## 2022-03-30 RX ADMIN — CEFTRIAXONE 500 MG: 500 INJECTION, POWDER, FOR SOLUTION INTRAMUSCULAR; INTRAVENOUS at 12:20

## 2022-03-30 ASSESSMENT — ENCOUNTER SYMPTOMS
BACK PAIN: 0
ABDOMINAL PAIN: 0
ABDOMINAL DISTENTION: 0
NAUSEA: 0
VOMITING: 0

## 2022-03-30 ASSESSMENT — PATIENT HEALTH QUESTIONNAIRE - PHQ9
SUM OF ALL RESPONSES TO PHQ QUESTIONS 1-9: 0
1. LITTLE INTEREST OR PLEASURE IN DOING THINGS: 0
2. FEELING DOWN, DEPRESSED OR HOPELESS: 0
SUM OF ALL RESPONSES TO PHQ9 QUESTIONS 1 & 2: 0
SUM OF ALL RESPONSES TO PHQ QUESTIONS 1-9: 0

## 2022-03-30 NOTE — PROGRESS NOTES
[address] [place] [facility] [id number] [place] [address] WALK IN CARE  [address] [place] [address] [address] [id number]  Dept: [phone]    Tia Holland is a 24 y.o. female Established patient, who presents to the walk-in clinic today with conditions/complaints as noted below:    Chief Complaint   Patient presents with    Exposure to STD     pt has been having white discharge X 2 months was treated and still didnt work          HPI:     Vaginal Discharge  The patient's primary symptoms include vaginal discharge. The patient's pertinent negatives include no genital itching, genital odor, genital rash, missed menses, pelvic pain or vaginal bleeding. This is a recurrent problem. The current episode started more than 1 month ago (ongoing for 2 months). The problem occurs daily. The problem has been unchanged. The patient is experiencing no pain. Pregnant now: unknown. Associated symptoms include frequency. Pertinent negatives include no abdominal pain, back pain, chills, dysuria, fever, flank pain, hematuria, nausea, painful intercourse, rash or vomiting. The vaginal discharge was white, thick and thin. There has been no bleeding. Nothing aggravates the symptoms. She has tried antibiotics (doxycycline) for the symptoms. The treatment provided no relief. She is sexually active. It is unknown whether or not her partner has an STD. She uses nothing for contraception. Her menstrual history has been regular. Her past medical history is significant for an STD. Patient states that she tested positive for chlamydia when the vaginal discharge initially started. She's currently on her second course of doxycycline without any improvement. History reviewed. No pertinent past medical history.     Current Outpatient Medications   Medication Sig Dispense Refill    azithromycin (ZITHROMAX) 500 MG tablet Take 2 tablets by mouth once for 1 dose 2 tablet 0    ondansetron (ZOFRAN ODT) 4 MG disintegrating tablet Place 1 tablet under the tongue every 8 hours as needed for Nausea 5 tablet 0    dicyclomine (BENTYL) 10 MG capsule Take 1 capsule by mouth every 6 hours as needed (cramps) 9 capsule 0    FLUoxetine (PROZAC) 10 MG capsule Take 1-2 capsules by mouth daily 60 capsule 0    tretinoin (RETIN-A) 0.025 % gel Apply topically nightly. 45 g 1    benzoyl peroxide [facility] WASH) 5 % external liquid Apply topically 2 times daily. 1 Bottle 1    medroxyPROGESTERone (DEPO-PROVERA) 150 MG/ML injection Inject 1 mL into the muscle once for 1 dose 1 mL 3    ferrous sulfate (FE TABS) 325 (65 FE) MG EC tablet Take 1 tablet by mouth 2 times daily 60 tablet 5     No current facility-administered medications for this visit. No Known Allergies    :     Review of Systems   Constitutional: Negative for chills and fever. Gastrointestinal: Negative for abdominal distention, abdominal pain, nausea and vomiting. Genitourinary: Positive for frequency and vaginal discharge. Negative for dyspareunia, dysuria, flank pain, hematuria, menstrual problem, missed menses, pelvic pain and vaginal bleeding. Musculoskeletal: Negative for arthralgias and back pain. Skin: Negative for rash.       :     /79 (Site: Left Upper Arm, Position: Sitting, Cuff Size: Large Adult)   Pulse 78   Temp 97.3 °F (36.3 °C) (Tympanic)   Ht 5' 1\" (1.549 m)   Wt 150 lb (68 kg)   SpO2 97%   Breastfeeding No   BMI 28.34 kg/m²     Physical Exam  Vitals reviewed. Constitutional:       General: She is not in acute distress. Appearance: Normal appearance. She is not ill-appearing. Cardiovascular:      Rate and Rhythm: Normal rate and regular rhythm. Heart sounds: Normal heart sounds. No murmur heard. Pulmonary:      Effort: Pulmonary effort is normal.      Breath sounds: Normal breath sounds.    Abdominal: General: Abdomen is flat. Bowel sounds are normal. There is no distension. Palpations: Abdomen is soft. Tenderness: There is no abdominal tenderness. There is no right CVA tenderness, left CVA tenderness or guarding. Genitourinary:     Comments: Refused  Musculoskeletal:         General: Normal range of motion. Skin:     General: Skin is warm and dry. Findings: No rash. Neurological:      Mental Status: She is alert and oriented to person, place, and time. Psychiatric:         Attention and Perception: Attention normal.         Mood and Affect: Mood normal.         Speech: Speech normal.         Behavior: Behavior normal. Behavior is cooperative.           :          1. Vaginal discharge  -     POCT urine pregnancy  -     POCT Urinalysis No Micro (Auto)  -     Vaginitis DNA Probe; Future  -     Culture, Urine; Future  -     cefTRIAXone (ROCEPHIN) injection 500 mg; 500 mg, IntraMUSCular, ONCE, 1 dose, On Wed 3/30/22 at 1115Antimicrobial Indications: STD infectionSuspected Organism(s): gonorrhea  -     azithromycin (ZITHROMAX) 500 MG tablet; Take 2 tablets by mouth once for 1 dose, Disp-2 tablet, R-0Normal  2. Hx of chlamydia infection  -     Vaginitis DNA Probe; Future  -     C.trachomatis N.gonorrhoeae DNA, Urine; Future  -     azithromycin (ZITHROMAX) 500 MG tablet; Take 2 tablets by mouth once for 1 dose, Disp-2 tablet, R-0Normal  3. Encounter for pregnancy test, result unknown  -     HCG, Quantitative, Pregnancy; Future       :      Return if symptoms worsen or fail to improve.     Orders Placed This Encounter   Medications    cefTRIAXone (ROCEPHIN) injection 500 mg     Order Specific Question:   Antimicrobial Indications     Answer:   STD infection     Order Specific Question:   Suspected Organism(s)     Answer:   gonorrhea    azithromycin (ZITHROMAX) 500 MG tablet     Sig: Take 2 tablets by mouth once for 1 dose     Dispense:  2 tablet     Refill:  0     Results for POC orders placed in visit on 03/30/22   POCT Urinalysis No Micro (Auto)   Result Value Ref Range    Color, UA yellow     Clarity, UA cloudy     Glucose, UA POC neg     Bilirubin, UA neg     Ketones, UA neg     Spec Grav, UA 1.025     Blood, UA POC trace     pH, UA 6.5     Protein, UA POC neg     Urobilinogen, UA 1.0     Leukocytes, UA small     Nitrite, UA neg    POCT urine pregnancy   Result Value Ref Range    Preg Test, Ur (I)     Control pos      Urinalysis and urine pregnancy performed in office and results reviewed with the patient. Sending urine for culture. Lab work ordered due to inconclusive pregnancy test.  Patient elected to self-swab for Vaginitis DNA probe, will call with results. IM injection of Rocephin given in office, patient tolerated. One-time dose of azithromycin sent to pharmacy. Instructed to abstain from sexual intercourse pending test results and completion of treatment. Follow-up with PCP. Patient and/or parent given educational materials - see patient instructions. Discussed use, benefit, and side effects of prescribed medications. All patient questions answered. Patient and/or parent voiced understanding.       Electronically signed by JENNIFER Lindo 3/30/2022 at 1:56 PM

## 2022-03-30 NOTE — PATIENT INSTRUCTIONS
Will call with results. One-time dose of antibiotic sent to pharmacy. Lab work ordered, please have completed. Abstain from intercourse pending resolution of symptoms and completion of treatment. Follow-up with PCP. Patient Education        Exposure to Sexually Transmitted Infections: Care Instructions  Overview  Sexually transmitted infections (STIs) are diseases spread by sexual contact. This includes vaginal, oral, and anal sex. If you're pregnant, you can alsospread them to your baby before or during the birth. There are at least 20 different STIs. They include chlamydia, gonorrhea, syphilis, and human immunodeficiency virus (HIV). (This is the virus thatcauses AIDS.) Some STIs can reduce the chance of getting pregnant in the future. Treatment can cure STIs caused by bacteria. STIs caused by viruses, such asHIV, can be treated, but they can't be cured. Follow-up care is a key part of your treatment and safety. Be sure to make and go to all appointments, and call your doctor if you are having problems. It's also a good idea to know your test results and keep alist of the medicines you take. How can you care for yourself at home?  Take medicines exactly as prescribed.  If your doctor prescribed antibiotics, take them as directed. Don't stop taking them just because you feel better. You need to take the full course of antibiotics.  Tell your sex partner(s) that they will need treatment.  Don't douche. Douching changes the normal balance of bacteria in your vagina. It may increase the risk of spreading the infection to your uterus or other reproductive organs. How can you prevent sexually transmitted infections (STIs)? You can help prevent STIs if you wait to have sex with a new partner (or partners) until you've each been tested for STIs. It also helps if you use condoms (male or female condoms) and if you limit your sex partners to Sofia who only has sex with you.   When should you call for help?   Call your doctor now or seek immediate medical care if:     You have new pain in your belly or pelvis.      You have symptoms of a urinary tract infection. These may include:  ? Pain or burning when you urinate. ? A frequent need to urinate without being able to pass much urine. ? Pain in the flank, which is just below the rib cage and above the waist on either side of the back. ? Blood in your urine. ? A fever.      You have new or worsening pain or swelling in the scrotum. Watch closely for changes in your health, and be sure to contact your doctor if:     You have unusual vaginal bleeding.      You have a discharge from the vagina or penis.      You have any new symptoms, such as sores, bumps, rashes, blisters, or warts.      You have itching, tingling, pain, or burning in the genital or anal area.      You think you may have an STI. Where can you learn more? Go to https://Tabula.healthOkta. org and sign in to your [employer] account. Enter B578 in the Raidarrr box to learn more about \"Exposure to Sexually Transmitted Infections: Care Instructions. \"     If you do not have an account, please click on the \"Sign Up Now\" link. Current as of: November 17, 2021               Content Version: 13.2  © 2006-2022 Healthwise, Incorporated. Care instructions adapted under license by [facility] ([facility]). If you have questions about a medical condition or this instruction, always ask your healthcare professional. [place] [address] any warranty or liability for your use of this information.

## 2022-03-31 DIAGNOSIS — N76.0 BV (BACTERIAL VAGINOSIS): Primary | ICD-10-CM

## 2022-03-31 DIAGNOSIS — A59.01 TRICHOMONAL VAGINITIS: ICD-10-CM

## 2022-03-31 DIAGNOSIS — B96.89 BV (BACTERIAL VAGINOSIS): Primary | ICD-10-CM

## 2022-03-31 LAB
C. TRACHOMATIS DNA ,URINE: NEGATIVE
CULTURE: NORMAL
N. GONORRHOEAE DNA, URINE: NEGATIVE
SPECIMEN DESCRIPTION: NORMAL
SPECIMEN DESCRIPTION: NORMAL

## 2022-03-31 RX ORDER — METRONIDAZOLE 500 MG/1
500 TABLET ORAL 2 TIMES DAILY
Qty: 14 TABLET | Refills: 0 | Status: SHIPPED | OUTPATIENT
Start: 2022-03-31 | End: 2022-04-07

## 2022-04-14 ENCOUNTER — OFFICE VISIT (OUTPATIENT)
Dept: PRIMARY CARE CLINIC | Age: 22
End: 2022-04-14
Payer: COMMERCIAL

## 2022-04-14 ENCOUNTER — HOSPITAL ENCOUNTER (OUTPATIENT)
Age: 22
Setting detail: SPECIMEN
Discharge: HOME | End: 2022-04-14

## 2022-04-14 VITALS — TEMPERATURE: 97.9 F | SYSTOLIC BLOOD PRESSURE: 117 MMHG | DIASTOLIC BLOOD PRESSURE: 81 MMHG

## 2022-04-14 DIAGNOSIS — N89.8 VAGINAL DISCHARGE: Primary | ICD-10-CM

## 2022-04-14 DIAGNOSIS — N89.8 VAGINAL DISCHARGE: ICD-10-CM

## 2022-04-14 LAB
CANDIDA SPECIES, DNA PROBE: POSITIVE
GARDNERELLA VAGINALIS, DNA PROBE: POSITIVE
SOURCE: ABNORMAL
TRICHOMONAS VAGINALIS DNA: NEGATIVE

## 2022-04-14 PROCEDURE — 99213 OFFICE O/P EST LOW 20 MIN: CPT | Performed by: NURSE PRACTITIONER

## 2022-04-14 ASSESSMENT — ENCOUNTER SYMPTOMS
WHEEZING: 0
RESPIRATORY NEGATIVE: 1
COUGH: 0
SHORTNESS OF BREATH: 0
ABDOMINAL PAIN: 0
SORE THROAT: 0
CHEST TIGHTNESS: 0

## 2022-04-14 NOTE — PROGRESS NOTES
[address] WALK [facility] CARE  [address]  [place] [address]  Dept: [phone]  Dept Fax: [phone]     Charmaine Rodriguez is a 24 y.o. female who presents to the urgent care today for her medicalconditions/complaints as noted below. Charmaine Rodriguez is c/o of Sexually Transmitted Diseases (vaginal discharge- has not improved since being seen 3/30)    HPI:      Sexually Transmitted Diseases   The patient's primary symptoms include a discharge. The patient's pertinent negatives include no dysuria, genital itching, genital rash or pelvic pain. This is a new problem. The current episode started 1 to 4 weeks ago. The problem has been unchanged. The vaginal discharge was copious, clear, thin and thick. Pertinent negatives include no abdominal pain, diaphoresis, fever, genital odor, sore throat or urinary frequency. Treatments tried: flagyl. The treatment provided no relief. Tested positive for BV and trichomona's on 3/30/22. Was treated with oral flagyl at that time. Chlamydia and gonorrhea testing was negative. She states that she continues to have copious discharge. Denies any odor at this time. Would like to be STD tested again and make sure previous infection cleared with the tx. States that she has not been sexually active since most recent treatment. No past medical history on file. Current Outpatient Medications   Medication Sig Dispense Refill    ondansetron (ZOFRAN ODT) 4 MG disintegrating tablet Place 1 tablet under the tongue every 8 hours as needed for Nausea 5 tablet 0    dicyclomine (BENTYL) 10 MG capsule Take 1 capsule by mouth every 6 hours as needed (cramps) 9 capsule 0    FLUoxetine (PROZAC) 10 MG capsule Take 1-2 capsules by mouth daily 60 capsule 0    tretinoin (RETIN-A) 0.025 % gel Apply topically nightly. 45 g 1    benzoyl peroxide [facility]) 5 % external liquid Apply topically 2 times daily.  1 Bottle 1    medroxyPROGESTERone (DEPO-PROVERA) 150 MG/ML injection Inject 1 mL into the muscle once for 1 dose 1 mL 3    ferrous sulfate (FE TABS) 325 (65 FE) MG EC tablet Take 1 tablet by mouth 2 times daily 60 tablet 5     No current facility-administered medications for this visit. No Known Allergies    Subjective:      Review of Systems   Constitutional: Negative for chills, diaphoresis, fatigue and fever. HENT: Negative for sore throat. Respiratory: Negative. Negative for cough, chest tightness, shortness of breath and wheezing. Cardiovascular: Negative. Negative for chest pain. Gastrointestinal: Negative for abdominal pain. Genitourinary: Positive for vaginal discharge. Negative for dysuria, frequency, hematuria, pelvic pain, urgency, vaginal bleeding and vaginal pain. Skin: Negative for rash. All other systems reviewed and are negative. Objective:      Physical Exam  Vitals and nursing note reviewed. Constitutional:       General: She is not in acute distress. Appearance: She is well-developed. She is not diaphoretic. HENT:      Head: Normocephalic and atraumatic. Cardiovascular:      Rate and Rhythm: Normal rate and regular rhythm. Heart sounds: Normal heart sounds. No murmur heard. Pulmonary:      Effort: Pulmonary effort is normal. No respiratory distress. Breath sounds: Normal breath sounds. No wheezing. Genitourinary:     Comments: Declined. Skin:     General: Skin is warm and dry. Neurological:      Mental Status: She is alert. /81   Temp 97.9 °F (36.6 °C) (Infrared)     Assessment:       Diagnosis Orders   1. Vaginal discharge  Vaginitis DNA Probe    Chlamydia/GC DNA, Urine     Plan:      Vaginitis DNA probe and urine for GC/Chlamydia sent out to the lab for testing. Treatment determinations based on the results. The patient indicates understanding of these issues and agrees with the plan.   Educational materials provided on AVS.  Follow up if symptoms do not improve/worsen. Patient given educational materials - see patient instructions. Discussed use, benefit, and side effects of prescribed medications. All patientquestions answered. Pt voiced understanding.     Electronically signed by JENNIFER Barnett CNP on 4/14/2022at 9:58 AM

## 2022-04-14 NOTE — PATIENT INSTRUCTIONS
Patient Education        Trichomoniasis: Care Instructions  Overview  Trichomoniasis is a sexually transmitted infection (STI) caused by a parasite. It's often called trich (say \"trick\"). You can get it by having sex with an infected partner. Trich may cause vaginal itching and a smelly discharge. Butin many cases, there are no symptoms. Trich needs to be treated so that you don't spread it to others. Both you and your sex partner(s) should be treated at the same time so you don't infect eachother again. If you're pregnant, your doctor will talk with you about treatment. Trich may cause problems with pregnancy. You could pass it to your baby during a vaginaldelivery. But this is rare. Follow-up care is a key part of your treatment and safety. Be sure to make and go to all appointments, and call your doctor if you are having problems. It's also a good idea to know your test results and keep alist of the medicines you take. How can you care for yourself at home?  Take your antibiotics as directed. Do not stop taking them just because you feel better. You need to take the full course of antibiotics.  Do not have sex while you are being treated. If your doctor gave you a single dose of antibiotics, do not have sex for one week after being treated and until your partner also has been treated.  Tell your sex partner (or partners) that they will also need to be tested and treated.  Use a cold water compress or cool baths to relieve itching. How can you prevent it? It's easier to prevent an STI than it is to treat one:   Limit your sex partners. The safest sex is with one partner who has sex only with you.  Talk with your partner or partners about STIs before you have sex. Find out if they are at risk for an STI. Remember that it's possible to have an STI and not know it.  Wait to have sex with new partners until you've each been tested.    Don't have sex if you have symptoms of an infection or if you are being treated for an STI.  Use a condom (a male or female condom) every time you have sex. Condoms are the only form of birth control that also helps prevent STIs.  If you're pregnant, be extra careful. Some STIs can be passed to your baby during delivery. Vaccines are available for some STIs, such as HPV. Ask your doctor for moreinformation. When should you call for help? Call your doctor now or seek immediate medical care if:     You have unusual vaginal bleeding.      You have a fever.      You have new discharge from the vagina or penis.      You have pelvic pain. Watch closely for changes in your health, and be sure to contact your doctor if:     You do not get better as expected.      You have any new symptoms or your symptoms get worse. Where can you learn more? Go to https://QuantHouseeb.healthElysia. org and sign in to your Chiasma account. Enter U459 in the uBank box to learn more about \"Trichomoniasis: Care Instructions. \"     If you do not have an account, please click on the \"Sign Up Now\" link. Current as of: November 17, 2021               Content Version: 13.2  © 3374-0256 SimpleSite. Care instructions adapted under license by [facility] ([facility]). If you have questions about a medical condition or this instruction, always ask your healthcare professional. [address] any warranty or liability for your use of this information. Patient Education        Bacterial Vaginosis: Care Instructions  Overview     Bacterial vaginosis is a type of vaginal infection. It is caused by excess growth of certain bacteria that are normally found in the vagina. Symptoms can include itching, swelling, pain when you urinate or have sex, and a gray or yellow discharge with a \"fishy\" odor. It is not considered an infection that isspread through sexual contact. Symptoms can be annoying and uncomfortable.  But bacterial vaginosis does not usually cause other health problems. However, if you have it while you arepregnant, it can cause complications. While the infection may go away on its own, most doctors use antibiotics to treat it. You may have been prescribed pills or vaginal cream. With treatment,bacterial vaginosis usually clears up in 5 to 7 days. Follow-up care is a key part of your treatment and safety. Be sure to make and go to all appointments, and call your doctor if you are having problems. It's also a good idea to know your test results and keep alist of the medicines you take. How can you care for yourself at home?  Take your antibiotics as directed. Do not stop taking them just because you feel better. You need to take the full course of antibiotics.  Do not eat or drink anything that contains alcohol if you are taking metronidazole or tinidazole.  Keep using your medicine if you start your period. Use pads instead of tampons while using a vaginal cream or suppository. Tampons can absorb the medicine.  Wear loose cotton clothing. Do not wear nylon and other materials that hold body heat and moisture close to the skin.  Do not scratch. Relieve itching with a cold pack or a cool bath.  Do not wash your vaginal area more than once a day. Use plain water or a mild, unscented soap. Do not douche. When should you call for help? Watch closely for changes in your health, and be sure to contact your doctor if:     You have unexpected vaginal bleeding.      You have a fever.      You have new or increased pain in your vagina or pelvis.      You are not getting better after 1 week.      Your symptoms return after you finish the course of your medicine. Where can you learn more? Go to https://glory.AeroDron. org and sign in to your [employer] account. Enter U237 in the ICEX box to learn more about \"Bacterial Vaginosis: Care Instructions. \"     If you do not have an account, please click on the \"Sign Up Now\" link. Current as of: November 22, 2021               Content Version: 13.2  © 2006-2022 Healthwise, Incorporated. Care instructions adapted under license by [facility] ([facility]). If you have questions about a medical condition or this instruction, always ask your healthcare professional. [place] [address] any warranty or liability for your use of this information.

## 2022-04-15 DIAGNOSIS — B96.89 BACTERIAL VAGINOSIS: Primary | ICD-10-CM

## 2022-04-15 DIAGNOSIS — N76.0 BACTERIAL VAGINOSIS: Primary | ICD-10-CM

## 2022-04-15 DIAGNOSIS — B37.31 VULVOVAGINAL CANDIDIASIS: ICD-10-CM

## 2022-04-15 LAB
C. TRACHOMATIS DNA ,URINE: NEGATIVE
N. GONORRHOEAE DNA, URINE: NEGATIVE
SPECIMEN DESCRIPTION: NORMAL

## 2022-04-15 RX ORDER — FLUCONAZOLE 150 MG/1
150 TABLET ORAL
Qty: 2 TABLET | Refills: 0 | Status: SHIPPED | OUTPATIENT
Start: 2022-04-15

## 2022-04-15 RX ORDER — METRONIDAZOLE 500 MG/1
500 TABLET ORAL 2 TIMES DAILY
Qty: 14 TABLET | Refills: 0 | Status: SHIPPED | OUTPATIENT
Start: 2022-04-15 | End: 2022-04-22

## 2022-06-18 ENCOUNTER — HOSPITAL ENCOUNTER (EMERGENCY)
Age: 22
Discharge: HOME | End: 2022-06-18
Attending: EMERGENCY MEDICINE
Payer: COMMERCIAL

## 2022-06-18 VITALS
HEART RATE: 90 BPM | RESPIRATION RATE: 16 BRPM | WEIGHT: 150 LBS | SYSTOLIC BLOOD PRESSURE: 121 MMHG | TEMPERATURE: 98.1 F | BODY MASS INDEX: 27.6 KG/M2 | OXYGEN SATURATION: 97 % | HEIGHT: 62 IN | DIASTOLIC BLOOD PRESSURE: 82 MMHG

## 2022-06-18 DIAGNOSIS — G44.319 ACUTE POST-TRAUMATIC HEADACHE, NOT INTRACTABLE: ICD-10-CM
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
## 2022-06-18 PROCEDURE — 6370000000 HC RX 637 (ALT 250 FOR IP): Performed by: STUDENT IN AN ORGANIZED HEALTH CARE EDUCATION/TRAINING PROGRAM

## 2022-06-18 PROCEDURE — 99283 EMERGENCY DEPT VISIT LOW MDM: CPT

## 2022-06-18 RX ORDER — ACETAMINOPHEN 500 MG
1000 TABLET ORAL 4 TIMES DAILY PRN
Qty: 40 TABLET | Refills: 0 | Status: SHIPPED | OUTPATIENT
Start: 2022-06-18

## 2022-06-18 RX ORDER — IBUPROFEN 800 MG/1
800 TABLET ORAL ONCE
Status: COMPLETED | OUTPATIENT
Start: 2022-06-18 | End: 2022-06-18

## 2022-06-18 RX ORDER — IBUPROFEN 600 MG/1
600 TABLET ORAL EVERY 6 HOURS PRN
Qty: 20 TABLET | Refills: 0 | Status: SHIPPED | OUTPATIENT
Start: 2022-06-18

## 2022-06-18 RX ORDER — ACETAMINOPHEN 500 MG
1000 TABLET ORAL ONCE
Status: COMPLETED | OUTPATIENT
Start: 2022-06-18 | End: 2022-06-18

## 2022-06-18 RX ADMIN — IBUPROFEN 800 MG: 800 TABLET, FILM COATED ORAL at 21:08

## 2022-06-18 RX ADMIN — ACETAMINOPHEN 1000 MG: 500 TABLET ORAL at 21:08

## 2022-06-18 ASSESSMENT — ENCOUNTER SYMPTOMS
SHORTNESS OF BREATH: 0
DIARRHEA: 0
CHEST TIGHTNESS: 0
BACK PAIN: 0
WHEEZING: 0
COLOR CHANGE: 0
ABDOMINAL PAIN: 0
COUGH: 0
NAUSEA: 0
PHOTOPHOBIA: 0
VOMITING: 0
CONSTIPATION: 0

## 2022-06-18 ASSESSMENT — PAIN SCALES - GENERAL: PAINLEVEL_OUTOF10: 9
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
## 2022-06-24 NOTE — ED PROVIDER NOTES
[address] [facility]     Emergency Department     Faculty Attestation    I performed a history and physical examination of the patient and discussed management with the resident. I have reviewed and agree with the residents findings including all diagnostic interpretations, and treatment plans as written. Any areas of disagreement are noted on the chart. I was personally present for the key portions of any procedures. I have documented in the chart those procedures where I was not present during the key portions. I have reviewed the emergency nurses triage note. I agree with the chief complaint, past medical history, past surgical history, allergies, medications, social and family history as documented unless otherwise noted below. Documentation of the HPI, Physical Exam and Medical Decision Making performed by scribes is based on my personal performance of the HPI, PE and MDM. For Physician Assistant/ Nurse Practitioner cases/documentation I have personally evaluated this patient and have completed at least one if not all key elements of the E/M (history, physical exam, and MDM). Additional findings are as noted.     Primary Care Physician: MD Sergio Mcnair D.O, M.P.H  Attending Emergency Medicine Physician        Sergio Grace,   06/24/22 5893

## 2022-09-19 ENCOUNTER — HOSPITAL ENCOUNTER (EMERGENCY)
Age: 22
Discharge: HOME | End: 2022-09-19
Attending: EMERGENCY MEDICINE
Payer: COMMERCIAL

## 2022-09-19 VITALS
HEART RATE: 63 BPM | SYSTOLIC BLOOD PRESSURE: 108 MMHG | BODY MASS INDEX: 27.44 KG/M2 | RESPIRATION RATE: 15 BRPM | TEMPERATURE: 99 F | DIASTOLIC BLOOD PRESSURE: 69 MMHG | OXYGEN SATURATION: 100 % | WEIGHT: 150 LBS

## 2022-09-19 DIAGNOSIS — A64 STD (FEMALE): Primary | ICD-10-CM

## 2022-09-19 LAB
BILIRUBIN URINE: NEGATIVE
CANDIDA SPECIES, DNA PROBE: NEGATIVE
CASTS UA: ABNORMAL /LPF (ref 0–8)
COLOR: YELLOW
EPITHELIAL CELLS UA: ABNORMAL /HPF (ref 0–5)
GARDNERELLA VAGINALIS, DNA PROBE: POSITIVE
GLUCOSE URINE: NEGATIVE
HCG(URINE) PREGNANCY TEST: NEGATIVE
HIV AG/AB: NONREACTIVE
KETONES, URINE: NEGATIVE
LEUKOCYTE ESTERASE, URINE: NEGATIVE
NITRITE, URINE: NEGATIVE
PH UA: 6.5 (ref 5–8)
PROTEIN UA: NEGATIVE
RBC UA: ABNORMAL /HPF (ref 0–4)
SOURCE: ABNORMAL
SPECIFIC GRAVITY UA: 1.02 (ref 1–1.03)
T. PALLIDUM, IGG: NONREACTIVE
TRICHOMONAS VAGINALIS DNA: NEGATIVE
TURBIDITY: CLEAR
URINE HGB: ABNORMAL
UROBILINOGEN, URINE: NORMAL
WBC UA: ABNORMAL /HPF (ref 0–5)

## 2022-09-19 PROCEDURE — 86403 PARTICLE AGGLUT ANTBDY SCRN: CPT

## 2022-09-19 PROCEDURE — 87660 TRICHOMONAS VAGIN DIR PROBE: CPT

## 2022-09-19 PROCEDURE — 87591 N.GONORRHOEAE DNA AMP PROB: CPT

## 2022-09-19 PROCEDURE — 99284 EMERGENCY DEPT VISIT MOD MDM: CPT

## 2022-09-19 PROCEDURE — 87389 HIV-1 AG W/HIV-1&-2 AB AG IA: CPT

## 2022-09-19 PROCEDURE — 87491 CHLMYD TRACH DNA AMP PROBE: CPT

## 2022-09-19 PROCEDURE — 6360000002 HC RX W HCPCS: Performed by: STUDENT IN AN ORGANIZED HEALTH CARE EDUCATION/TRAINING PROGRAM

## 2022-09-19 PROCEDURE — 81001 URINALYSIS AUTO W/SCOPE: CPT

## 2022-09-19 PROCEDURE — 87480 CANDIDA DNA DIR PROBE: CPT

## 2022-09-19 PROCEDURE — 87086 URINE CULTURE/COLONY COUNT: CPT

## 2022-09-19 PROCEDURE — 6370000000 HC RX 637 (ALT 250 FOR IP): Performed by: STUDENT IN AN ORGANIZED HEALTH CARE EDUCATION/TRAINING PROGRAM

## 2022-09-19 PROCEDURE — 81025 URINE PREGNANCY TEST: CPT

## 2022-09-19 PROCEDURE — 87510 GARDNER VAG DNA DIR PROBE: CPT

## 2022-09-19 PROCEDURE — 96372 THER/PROPH/DIAG INJ SC/IM: CPT

## 2022-09-19 PROCEDURE — 86780 TREPONEMA PALLIDUM: CPT

## 2022-09-19 RX ORDER — METRONIDAZOLE 500 MG/1
500 TABLET ORAL 2 TIMES DAILY
Qty: 14 TABLET | Refills: 0 | Status: SHIPPED | OUTPATIENT
Start: 2022-09-19 | End: 2022-09-26

## 2022-09-19 RX ORDER — CEFTRIAXONE 500 MG/1
500 INJECTION, POWDER, FOR SOLUTION INTRAMUSCULAR; INTRAVENOUS ONCE
Status: COMPLETED | OUTPATIENT
Start: 2022-09-19 | End: 2022-09-19

## 2022-09-19 RX ORDER — ONDANSETRON 4 MG/1
4 TABLET, ORALLY DISINTEGRATING ORAL EVERY 8 HOURS PRN
Status: DISCONTINUED | OUTPATIENT
Start: 2022-09-19 | End: 2022-09-19 | Stop reason: HOSPADM

## 2022-09-19 RX ORDER — DOXYCYCLINE HYCLATE 100 MG
100 TABLET ORAL ONCE
Status: COMPLETED | OUTPATIENT
Start: 2022-09-19 | End: 2022-09-19

## 2022-09-19 RX ORDER — DOXYCYCLINE HYCLATE 100 MG
100 TABLET ORAL 2 TIMES DAILY
Qty: 14 TABLET | Refills: 0 | Status: SHIPPED | OUTPATIENT
Start: 2022-09-19 | End: 2022-09-26

## 2022-09-19 RX ORDER — ONDANSETRON 4 MG/1
4 TABLET, ORALLY DISINTEGRATING ORAL EVERY 8 HOURS PRN
Qty: 3 TABLET | Refills: 0 | Status: SHIPPED | OUTPATIENT
Start: 2022-09-19

## 2022-09-19 RX ORDER — METRONIDAZOLE 500 MG/1
500 TABLET ORAL ONCE
Status: COMPLETED | OUTPATIENT
Start: 2022-09-19 | End: 2022-09-19

## 2022-09-19 RX ADMIN — CEFTRIAXONE SODIUM 500 MG: 500 INJECTION, POWDER, FOR SOLUTION INTRAMUSCULAR; INTRAVENOUS at 15:10

## 2022-09-19 RX ADMIN — METRONIDAZOLE 500 MG: 500 TABLET ORAL at 15:10

## 2022-09-19 RX ADMIN — DOXYCYCLINE HYCLATE 100 MG: 100 TABLET, COATED ORAL at 15:10

## 2022-09-19 RX ADMIN — ONDANSETRON 4 MG: 4 TABLET, ORALLY DISINTEGRATING ORAL at 15:10

## 2022-09-19 ASSESSMENT — PAIN - FUNCTIONAL ASSESSMENT: PAIN_FUNCTIONAL_ASSESSMENT: NONE - DENIES PAIN

## 2022-09-19 NOTE — ED NOTES
Pt to ER for c/o vaginal discharge. Pt states that she has been having the discharge for 2 weeks and states that it is white but does not have any odor. Pt states she does not have any abdominal pain with the discharge and states she is now on her period. Pt also states that last time she had this type of discharge she was positive for an STD and would like to get checked for that. No acute distress noted, RR even and NL.         Yecenia Zamora RN  09/19/22 7311

## 2022-09-19 NOTE — ED PROVIDER NOTES
Hearing Loss Neg Hx     Kidney Disease Neg Hx     Mental Illness Neg Hx     Mental Retardation Neg Hx     Miscarriages / Stillbirths Neg Hx         Allergies:  Patient has no known allergies. Home Medications:  Prior to Admission medications    Medication Sig Start Date End Date Taking? Authorizing Provider   doxycycline hyclate (VIBRA-TABS) 100 MG tablet Take 1 tablet by mouth 2 times daily for 7 days 9/19/22 9/26/22 Yes Jaspal Liu DO   metroNIDAZOLE (FLAGYL) 500 MG tablet Take 1 tablet by mouth 2 times daily for 7 days 9/19/22 9/26/22 Yes Jaspal Liu DO   ondansetron (ZOFRAN ODT) 4 MG disintegrating tablet Take 1 tablet by mouth every 8 hours as needed for Nausea 9/19/22  Yes Jaspal Liu DO   acetaminophen (TYLENOL) 500 MG tablet Take 2 tablets by mouth 4 times daily as needed for Pain 6/18/22   [place]ell [place] Gruenbaum, DO   ibuprofen (IBU) 600 MG tablet Take 1 tablet by mouth every 6 hours as needed for Pain 6/18/22   Vonzell [place] Gruenbaum, DO   fluconazole (DIFLUCAN) 150 MG tablet Take 1 tablet by mouth every 72 hours 4/15/22   Jamilah Tucker APRN - CNP   dicyclomine (BENTYL) 10 MG capsule Take 1 capsule by mouth every 6 hours as needed (cramps) 7/11/19   Syed Jay MD   FLUoxetine (PROZAC) 10 MG capsule Take 1-2 capsules by mouth daily 3/1/19 3/31/19  Mickey Betancourt MD   tretinoin (RETIN-A) 0.025 % gel Apply topically nightly. 3/1/19   Mickey Betancourt MD   benzoyl peroxide [facility] [address]) 5 % external liquid Apply topically 2 times daily.  2/16/18   Mickey Betancourt MD   medroxyPROGESTERone (DEPO-PROVERA) 150 MG/ML injection Inject 1 mL into the muscle once for 1 dose 10/9/17 10/9/17  Edison Prasad MD   ferrous sulfate (FE TABS) 325 (65 FE) MG EC tablet Take 1 tablet by mouth 2 times daily 1/20/17 2/19/17  Mickey Betancourt MD       REVIEW OFSYSTEMS    (2-9 systems for level 4, 10 or more for level 5)      Positive: Vaginal discharge, concern for STD    Negative: Fevers, chills, headache, eye pain, ear pain, difficulty swallowing, chest pain, shortness of breath, abdominal pain, nausea, vomiting, dysuria, diarrhea, constipation, numbness, tingling      PHYSICAL EXAM   (up to 7 for level 4, 8 or more forlevel 5)      INITIAL VITALS:   Vitals:    09/19/22 1211   BP: 108/69   Pulse: 63   Resp: 15   Temp: 99 °F (37.2 °C)   SpO2: 100%        Physical Exam  Vitals and nursing note reviewed. Constitutional:       General: She is not in acute distress. Appearance: She is not ill-appearing, toxic-appearing or diaphoretic. Comments: Pleasant, conversational, no acute distress, no respiratory distress, alert, oriented, answering all questions appropriately   HENT:      Head: Normocephalic and atraumatic. Nose: Nose normal.      Mouth/Throat:      Mouth: Mucous membranes are moist.      Pharynx: Oropharynx is clear. Eyes:      General:         Right eye: No discharge. Left eye: No discharge. Extraocular Movements: Extraocular movements intact. Pupils: Pupils are equal, round, and reactive to light. Cardiovascular:      Rate and Rhythm: Normal rate and regular rhythm. Pulses: Normal pulses. Heart sounds: Normal heart sounds. Pulmonary:      Effort: Pulmonary effort is normal. No respiratory distress. Breath sounds: Normal breath sounds. No wheezing or rales. Abdominal:      Comments: Soft, nondistended, nonperitoneal abdomen. No outward signs of trauma noted. Musculoskeletal:      Cervical back: Normal range of motion. No rigidity. Right lower leg: No edema. Left lower leg: No edema. Skin:     General: Skin is warm and dry. Neurological:      General: No focal deficit present. Mental Status: She is alert and oriented to person, place, and time. Psychiatric:         Mood and Affect: Mood normal.       DIFFERENTIAL  DIAGNOSIS       Initial MDM/Plan: 24 y.o. female who presents with concern for STD.  Upon my DO Jeremias      Strict return precautions provided. Patient expresses understanding of discharge instructions and is able to repeat strict return precautions back to me. Patient discharged in stable condition after remained vitally stable throughout emergency department stay. DIAGNOSTIC RESULTS / EMERGENCYDEPARTMENT COURSE / MDM     LABS:  Labs Reviewed   VAGINITIS DNA PROBE - Abnormal; Notable for the following components:       Result Value    GARDNERELLA VAGINALIS, DNA PROBE POSITIVE (*)     All other components within normal limits   URINALYSIS WITH MICROSCOPIC - Abnormal; Notable for the following components:    Urine Hgb LARGE (*)     All other components within normal limits   CULTURE, URINE   C.TRACHOMATIS N.GONORRHOEAE DNA   PREGNANCY, URINE   T.  PALLIDUM AB   HIV SCREEN           PROCEDURES:  None    CONSULTS:  None      FINAL IMPRESSION      1. STD (female)          DISPOSITION / PLAN     DISPOSITION Decision To Discharge 09/19/2022 02:38:16 PM      PATIENT REFERRED TO:  Addis Trent MD  [address] [place] [address]  [phone]    Call   for [facility] emergency department follow up    [facility]  [address] [place] [address]  [phone]    As needed, If symptoms worsen    [address] [place] [address]  [phone]  Schedule an appointment as soon as possible for a visit   To establish OB/GYN care    DISCHARGE MEDICATIONS:  New Prescriptions    DOXYCYCLINE HYCLATE (VIBRA-TABS) 100 MG TABLET    Take 1 tablet by mouth 2 times daily for 7 days    METRONIDAZOLE (FLAGYL) 500 MG TABLET    Take 1 tablet by mouth 2 times daily for 7 days    ONDANSETRON (ZOFRAN ODT) 4 MG DISINTEGRATING TABLET    Take 1 tablet by mouth every 8 hours as needed for Nausea       Lacy Rodriguez DO  Emergency Medicine Resident    (Please note that portions of this note were completed with a voice recognition program.Efforts were made to edit the dictations but occasionally words are mis-transcribed.)       Yoan Riddle DO  Resident  09/19/22 9473

## 2022-09-19 NOTE — Clinical Note
Luz Maria Ramasy was seen and treated in our emergency department on 9/19/2022. She may return to work on 09/20/2022. If you have any questions or concerns, please don't hesitate to call.       Robert Park, DO

## 2022-09-19 NOTE — ED PROVIDER NOTES
circumstances; and whenever words are used in this note in the singular or plural form, they shall be construed as though they were used in the form appropriate to the circumstances.)    MD Helga Falk  Attending Emergency Medicine Physician           Erinn Haque MD  09/19/22 1138       Erinn Haque MD  09/19/22 1137

## 2022-09-19 NOTE — DISCHARGE INSTRUCTIONS
Mansi Angeles!!! On behalf of the Emergency Department staff at [facility]. [facility] Emergency Department, I would like to thank you for giving Benson Murray the opportunity to address your health care needs and concerns. We hope that during your visit, our service was delivered in a professional and caring manner. Please keep Benson Murray in mind as we walk with you down the path to your own personal wellness. Please expect an automated phone call from [phone] so we can ask a few questions about your health and progress. Based on your answers, a clinician may call you back to offer help and instructions. If you notice any concerning symptoms please return to the ER immediately. These can include but are not limited to: fevers, chills, shortness of breath, vomiting, weakness of the extremities, changes in your mental status, numbness, pale extremities, or chest pain. SUMMARY OF YOUR VISIT    Today you are seen due to concerns for STD. We treated you for gonorrhea, chlamydia as well as Gardnerella vaginalis, Gardnerella vaginalis is not an STD this is normal or of the vagina and does cause issues when overgrown. Commend follow-up with primary care provider and OB/GYN team I provided with information follow-up with both. You return the emergency department as needed symptoms worsen, currently to, fevers, chills, chest pain, shortness of breath, abdominal pain, nausea, vomiting, or any other concerns. Please take your prescriptions as prescribed for the full duration.

## 2022-09-20 ENCOUNTER — TELEPHONE (OUTPATIENT)
Dept: OBGYN | Age: 22
End: 2022-09-20

## 2022-09-20 LAB
C TRACH DNA GENITAL QL NAA+PROBE: NEGATIVE
CULTURE: ABNORMAL
N. GONORRHOEAE DNA: NEGATIVE
SPECIMEN DESCRIPTION: ABNORMAL
SPECIMEN DESCRIPTION: NORMAL

## 2022-11-14 ENCOUNTER — OFFICE VISIT (OUTPATIENT)
Dept: PRIMARY CARE CLINIC | Age: 22
End: 2022-11-14
Payer: COMMERCIAL

## 2022-11-14 ENCOUNTER — HOSPITAL ENCOUNTER (OUTPATIENT)
Age: 22
Setting detail: SPECIMEN
Discharge: HOME | End: 2022-11-14

## 2022-11-14 VITALS
DIASTOLIC BLOOD PRESSURE: 79 MMHG | HEIGHT: 62 IN | WEIGHT: 150 LBS | SYSTOLIC BLOOD PRESSURE: 116 MMHG | BODY MASS INDEX: 27.6 KG/M2 | HEART RATE: 80 BPM | OXYGEN SATURATION: 97 % | TEMPERATURE: 97.5 F

## 2022-11-14 DIAGNOSIS — N89.8 VAGINAL DISCHARGE: Primary | ICD-10-CM

## 2022-11-14 LAB
BILIRUBIN, POC: NEGATIVE
BLOOD URINE, POC: NEGATIVE
CLARITY, POC: ABNORMAL
COLOR, POC: YELLOW
GLUCOSE URINE, POC: NEGATIVE
KETONES, POC: ABNORMAL
LEUKOCYTE EST, POC: NEGATIVE
NITRITE, POC: NEGATIVE
PH, POC: 5.5
PROTEIN, POC: NEGATIVE
SPECIFIC GRAVITY, POC: >=1.03
UROBILINOGEN, POC: 1

## 2022-11-14 PROCEDURE — 99213 OFFICE O/P EST LOW 20 MIN: CPT

## 2022-11-14 PROCEDURE — 81002 URINALYSIS NONAUTO W/O SCOPE: CPT

## 2022-11-14 RX ORDER — DOXYCYCLINE HYCLATE 100 MG
100 TABLET ORAL 2 TIMES DAILY
Qty: 14 TABLET | Refills: 0 | Status: SHIPPED | OUTPATIENT
Start: 2022-11-14 | End: 2022-11-21

## 2022-11-14 SDOH — ECONOMIC STABILITY: FOOD INSECURITY: WITHIN THE PAST 12 MONTHS, THE FOOD YOU BOUGHT JUST DIDN'T LAST AND YOU DIDN'T HAVE MONEY TO GET MORE.: NEVER TRUE

## 2022-11-14 SDOH — ECONOMIC STABILITY: FOOD INSECURITY: WITHIN THE PAST 12 MONTHS, YOU WORRIED THAT YOUR FOOD WOULD RUN OUT BEFORE YOU GOT MONEY TO BUY MORE.: NEVER TRUE

## 2022-11-14 ASSESSMENT — ENCOUNTER SYMPTOMS
EYES NEGATIVE: 1
ANAL BLEEDING: 0
COUGH: 0
BACK PAIN: 0
APNEA: 0
ABDOMINAL PAIN: 0
SHORTNESS OF BREATH: 0
VOMITING: 0
FACIAL SWELLING: 0
EYE ITCHING: 0
STRIDOR: 0
CONSTIPATION: 0
EYE PAIN: 0
BLOOD IN STOOL: 0
EYE DISCHARGE: 0
ABDOMINAL DISTENTION: 0
PHOTOPHOBIA: 0
RHINORRHEA: 0
WHEEZING: 0
CHOKING: 0
EYE REDNESS: 0
SINUS PRESSURE: 0
TROUBLE SWALLOWING: 0
GASTROINTESTINAL NEGATIVE: 1
COLOR CHANGE: 0
SORE THROAT: 0
DIARRHEA: 0
NAUSEA: 0
VOICE CHANGE: 0
RECTAL PAIN: 0
SINUS PAIN: 0
CHEST TIGHTNESS: 0
RESPIRATORY NEGATIVE: 1

## 2022-11-14 ASSESSMENT — SOCIAL DETERMINANTS OF HEALTH (SDOH): HOW HARD IS IT FOR YOU TO PAY FOR THE VERY BASICS LIKE FOOD, HOUSING, MEDICAL CARE, AND HEATING?: SOMEWHAT HARD

## 2022-11-14 NOTE — PROGRESS NOTES
[address] [place] [facility] [id number] [place] [address] WALK IN CARE  [address] [place] [address]  Dept: [phone]    Ayah Guerrier is a 24 y.o. female Established patient, who presents to the walk-in clinic today with conditions/complaints as noted below:    Chief Complaint   Patient presents with    Vaginal Discharge     2 weeks         HPI:     Vaginal Discharge  The patient's primary symptoms include vaginal discharge. The patient's pertinent negatives include no genital itching, genital lesions, genital odor, genital rash, missed menses, pelvic pain or vaginal bleeding. This is a new problem. Episode onset: 2 weeks ago. The problem occurs constantly. The problem has been gradually worsening. The patient is experiencing no pain. She is not pregnant. Pertinent negatives include no abdominal pain, anorexia, back pain, chills, constipation, diarrhea, discolored urine, dysuria, fever, flank pain, frequency, headaches, hematuria, joint pain, joint swelling, nausea, painful intercourse, rash, sore throat, urgency or vomiting. The vaginal discharge was thick and white. There has been no bleeding. She has not been passing clots. She has not been passing tissue. She has tried nothing for the symptoms. She is sexually active. It is unknown whether or not her partner has an STD. History reviewed. No pertinent past medical history.     Current Outpatient Medications   Medication Sig Dispense Refill    doxycycline hyclate (VIBRA-TABS) 100 MG tablet Take 1 tablet by mouth 2 times daily for 7 days 14 tablet 0    ondansetron (ZOFRAN ODT) 4 MG disintegrating tablet Take 1 tablet by mouth every 8 hours as needed for Nausea (Patient not taking: Reported on 11/14/2022) 3 tablet 0    acetaminophen (TYLENOL) 500 MG tablet Take 2 tablets by mouth 4 times daily as needed for Pain (Patient not taking: Reported on 11/14/2022) 40 tablet 0    ibuprofen (IBU) 600 MG tablet Take 1 tablet by mouth every 6 hours as needed for Pain (Patient not taking: Reported on 11/14/2022) 20 tablet 0    fluconazole (DIFLUCAN) 150 MG tablet Take 1 tablet by mouth every 72 hours (Patient not taking: Reported on 11/14/2022) 2 tablet 0    dicyclomine (BENTYL) 10 MG capsule Take 1 capsule by mouth every 6 hours as needed (cramps) (Patient not taking: Reported on 11/14/2022) 9 capsule 0    FLUoxetine (PROZAC) 10 MG capsule Take 1-2 capsules by mouth daily 60 capsule 0    tretinoin (RETIN-A) 0.025 % gel Apply topically nightly. (Patient not taking: Reported on 11/14/2022) 45 g 1    benzoyl peroxide [facility] W WASH) 5 % external liquid Apply topically 2 times daily. (Patient not taking: Reported on 11/14/2022) 1 Bottle 1    medroxyPROGESTERone (DEPO-PROVERA) 150 MG/ML injection Inject 1 mL into the muscle once for 1 dose 1 mL 3    ferrous sulfate (FE TABS) 325 (65 FE) MG EC tablet Take 1 tablet by mouth 2 times daily 60 tablet 5     No current facility-administered medications for this visit. No Known Allergies    Review of Systems:     Review of Systems   Constitutional: Negative. Negative for activity change, appetite change, chills, diaphoresis, fatigue, fever and unexpected weight change. HENT: Negative. Negative for congestion, dental problem, drooling, ear discharge, ear pain, facial swelling, hearing loss, mouth sores, nosebleeds, postnasal drip, rhinorrhea, sinus pressure, sinus pain, sneezing, sore throat, tinnitus, trouble swallowing and voice change. Eyes: Negative. Negative for photophobia, pain, discharge, redness, itching and visual disturbance. Respiratory: Negative. Negative for apnea, cough, choking, chest tightness, shortness of breath, wheezing and stridor. Cardiovascular: Negative. Negative for chest pain, palpitations and leg swelling. Gastrointestinal: Negative. Negative for abdominal distention, abdominal pain, anal bleeding, anorexia, blood in stool, constipation, diarrhea, nausea, rectal pain and vomiting. Genitourinary:  Positive for vaginal discharge. Negative for decreased urine volume, difficulty urinating, dyspareunia, dysuria, enuresis, flank pain, frequency, genital sores, hematuria, menstrual problem, missed menses, pelvic pain, urgency, vaginal bleeding and vaginal pain. Musculoskeletal: Negative. Negative for arthralgias, back pain, gait problem, joint pain, joint swelling, myalgias, neck pain and neck stiffness. Skin: Negative. Negative for color change, pallor, rash and wound. Neurological: Negative. Negative for dizziness, tremors, seizures, syncope, facial asymmetry, speech difficulty, weakness, light-headedness, numbness and headaches. Physical Exam:      /79   Pulse 80   Temp 97.5 °F (36.4 °C)   Ht 5' 2\" (1.575 m)   Wt 150 lb (68 kg)   SpO2 97%   BMI 27.44 kg/m²     Physical Exam  Vitals reviewed. Constitutional:       Appearance: Normal appearance. She is normal weight. HENT:      Head: Normocephalic and atraumatic. Right Ear: Tympanic membrane, ear canal and external ear normal.      Left Ear: Tympanic membrane, ear canal and external ear normal.      Nose: Nose normal.      Mouth/Throat:      Mouth: Mucous membranes are moist.      Pharynx: Oropharynx is clear. Eyes:      Extraocular Movements: Extraocular movements intact. Conjunctiva/sclera: Conjunctivae normal.      Pupils: Pupils are equal, round, and reactive to light. Cardiovascular:      Rate and Rhythm: Normal rate and regular rhythm. Pulses: Normal pulses. Heart sounds: Normal heart sounds. Pulmonary:      Effort: Pulmonary effort is normal.      Breath sounds: Normal breath sounds and air entry. Abdominal:      General: Abdomen is flat. Bowel sounds are normal.      Palpations: Abdomen is soft.    Musculoskeletal:         General: Normal range of motion. Cervical back: Normal range of motion and neck supple. Skin:     General: Skin is warm and dry. Capillary Refill: Capillary refill takes less than 2 seconds. Neurological:      General: No focal deficit present. Mental Status: She is alert and oriented to person, place, and time. Mental status is at baseline. Psychiatric:         Mood and Affect: Mood normal.         Behavior: Behavior normal.       Plan:          1. Vaginal discharge  -     Chlamydia/GC DNA, Urine; Future  -     Vaginitis DNA Probe; Future  -     Culture, Urine; Future  -     doxycycline hyclate (VIBRA-TABS) 100 MG tablet; Take 1 tablet by mouth 2 times daily for 7 days, Disp-14 tablet, R-0Normal  -     POCT Urinalysis no Micro   Results for POC orders placed in visit on 11/14/22   POCT Urinalysis no Micro   Result Value Ref Range    Color, UA yellow     Clarity, UA cloudy     Glucose, UA POC negative     Bilirubin, UA negative     Ketones, UA 40 mg/dL     Spec Grav, UA >=1.030     Blood, UA POC negative     pH, UA 5.5     Protein, UA POC negative     Urobilinogen, UA 1.0     Leukocytes, UA negative     Nitrite, UA negative      Negative UA in office,urine culture, vaginitis, g/c pending. Due to severity of symptoms, patient requesting treatment today  Doxy sent to pharmacy, may start immediately. Tx plans may change pending results. Follow Up Instructions:      Return if symptoms worsen or fail to improve, for SOB, chest pain go to ER. Orders Placed This Encounter   Medications    doxycycline hyclate (VIBRA-TABS) 100 MG tablet     Sig: Take 1 tablet by mouth 2 times daily for 7 days     Dispense:  14 tablet     Refill:  0               Patient and/or parent given educational materials - see patient instructions. Discussed use, benefit, and side effects of prescribed medications. All patient questions answered. Patient and/or parent voiced understanding.       Electronically signed by Albert Quiroz [address] [facility], JENNIFER - Irene 11/14/2022 at 5:34 PM

## 2022-11-15 DIAGNOSIS — N89.8 VAGINAL DISCHARGE: ICD-10-CM

## 2022-11-15 DIAGNOSIS — N76.0 BV (BACTERIAL VAGINOSIS): Primary | ICD-10-CM

## 2022-11-15 DIAGNOSIS — B96.89 BV (BACTERIAL VAGINOSIS): Primary | ICD-10-CM

## 2022-11-15 LAB
CANDIDA SPECIES, DNA PROBE: NEGATIVE
GARDNERELLA VAGINALIS, DNA PROBE: POSITIVE
SOURCE: ABNORMAL
TRICHOMONAS VAGINALIS DNA: NEGATIVE

## 2022-11-15 RX ORDER — METRONIDAZOLE 500 MG/1
500 TABLET ORAL 2 TIMES DAILY
Qty: 14 TABLET | Refills: 0 | Status: SHIPPED | OUTPATIENT
Start: 2022-11-15 | End: 2022-11-22

## 2022-12-02 ENCOUNTER — TELEPHONE (OUTPATIENT)
Dept: PRIMARY CARE CLINIC | Age: 22
End: 2022-12-02

## 2022-12-02 DIAGNOSIS — N76.0 BV (BACTERIAL VAGINOSIS): Primary | ICD-10-CM

## 2022-12-02 DIAGNOSIS — B96.89 BV (BACTERIAL VAGINOSIS): Primary | ICD-10-CM

## 2022-12-02 RX ORDER — CLINDAMYCIN HYDROCHLORIDE 300 MG/1
300 CAPSULE ORAL 2 TIMES DAILY
Qty: 14 CAPSULE | Refills: 0 | Status: SHIPPED | OUTPATIENT
Start: 2022-12-02 | End: 2022-12-09

## 2022-12-02 NOTE — TELEPHONE ENCOUNTER
Pt states she is still having discharge and the same symptoms with BV. She would like another round of meds sent in if possible.  Please send to [facility] [address].

## 2023-06-26 LAB
CHLAMYDIA TRACH., AMPLIFIED: NEGATIVE
N. GONORRHEA, AMPLIFIED: NEGATIVE
TRICHOMONAS VAGINALIS: NEGATIVE

## 2023-06-27 LAB — URINE CULTURE: NORMAL

## 2023-07-26 ENCOUNTER — HOSPITAL ENCOUNTER (EMERGENCY)
Age: 23
Discharge: HOME | End: 2023-07-26
Attending: EMERGENCY MEDICINE
Payer: COMMERCIAL

## 2023-07-26 VITALS
OXYGEN SATURATION: 99 % | BODY MASS INDEX: 27.44 KG/M2 | WEIGHT: 150 LBS | RESPIRATION RATE: 16 BRPM | HEART RATE: 79 BPM | TEMPERATURE: 99.7 F | SYSTOLIC BLOOD PRESSURE: 128 MMHG | DIASTOLIC BLOOD PRESSURE: 80 MMHG

## 2023-07-26 DIAGNOSIS — N89.8 VAGINAL DISCHARGE: Primary | ICD-10-CM

## 2023-07-26 DIAGNOSIS — Z20.2 POSSIBLE EXPOSURE TO STD: ICD-10-CM

## 2023-07-26 LAB
BILIRUB UR QL STRIP: NEGATIVE
CANDIDA SPECIES: NEGATIVE
CLARITY UR: CLEAR
COLOR UR: YELLOW
GARDNERELLA VAGINALIS: POSITIVE
GLUCOSE UR STRIP-MCNC: NEGATIVE MG/DL
HCG UR QL: NEGATIVE
HGB UR QL STRIP.AUTO: NEGATIVE
KETONES UR STRIP-MCNC: NEGATIVE MG/DL
LEUKOCYTE ESTERASE UR QL STRIP: NEGATIVE
NITRITE UR QL STRIP: NEGATIVE
PH UR STRIP: 7 [PH] (ref 5–8)
PROT UR STRIP-MCNC: NEGATIVE MG/DL
SOURCE: ABNORMAL
SP GR UR STRIP: 1.01 (ref 1–1.03)
TRICHOMONAS: NEGATIVE
UROBILINOGEN UR STRIP-ACNC: NORMAL EU/DL (ref 0–1)

## 2023-07-26 PROCEDURE — 6360000002 HC RX W HCPCS: Performed by: PHYSICIAN ASSISTANT

## 2023-07-26 PROCEDURE — 99284 EMERGENCY DEPT VISIT MOD MDM: CPT

## 2023-07-26 PROCEDURE — 81003 URINALYSIS AUTO W/O SCOPE: CPT

## 2023-07-26 PROCEDURE — 87660 TRICHOMONAS VAGIN DIR PROBE: CPT

## 2023-07-26 PROCEDURE — 87480 CANDIDA DNA DIR PROBE: CPT

## 2023-07-26 PROCEDURE — 87491 CHLMYD TRACH DNA AMP PROBE: CPT

## 2023-07-26 PROCEDURE — 81025 URINE PREGNANCY TEST: CPT

## 2023-07-26 PROCEDURE — 87510 GARDNER VAG DNA DIR PROBE: CPT

## 2023-07-26 PROCEDURE — 96372 THER/PROPH/DIAG INJ SC/IM: CPT

## 2023-07-26 PROCEDURE — 87591 N.GONORRHOEAE DNA AMP PROB: CPT

## 2023-07-26 PROCEDURE — 6370000000 HC RX 637 (ALT 250 FOR IP): Performed by: PHYSICIAN ASSISTANT

## 2023-07-26 RX ORDER — CEFTRIAXONE 500 MG/1
500 INJECTION, POWDER, FOR SOLUTION INTRAMUSCULAR; INTRAVENOUS ONCE
Status: COMPLETED | OUTPATIENT
Start: 2023-07-26 | End: 2023-07-26

## 2023-07-26 RX ORDER — METRONIDAZOLE 500 MG/1
500 TABLET ORAL 2 TIMES DAILY
Qty: 14 TABLET | Refills: 0 | Status: SHIPPED | OUTPATIENT
Start: 2023-07-26 | End: 2023-08-02

## 2023-07-26 RX ORDER — AZITHROMYCIN 250 MG/1
1000 TABLET, FILM COATED ORAL ONCE
Status: COMPLETED | OUTPATIENT
Start: 2023-07-26 | End: 2023-07-26

## 2023-07-26 RX ADMIN — CEFTRIAXONE SODIUM 500 MG: 500 INJECTION, POWDER, FOR SOLUTION INTRAMUSCULAR; INTRAVENOUS at 16:00

## 2023-07-26 RX ADMIN — AZITHROMYCIN DIHYDRATE 1000 MG: 250 TABLET ORAL at 16:00

## 2023-07-26 ASSESSMENT — PAIN - FUNCTIONAL ASSESSMENT: PAIN_FUNCTIONAL_ASSESSMENT: NONE - DENIES PAIN

## 2023-07-27 LAB
C TRACH DNA SPEC QL PROBE+SIG AMP: NEGATIVE
N GONORRHOEA DNA SPEC QL PROBE+SIG AMP: NEGATIVE
SPECIMEN DESCRIPTION: NORMAL

## 2023-07-27 NOTE — ED PROVIDER NOTES
eMERGENCY dEPARTMENT eNCOUnter   [address] Name: Benitez Leal  MRN: [id number]  [address] [facility] 2000  Date of evaluation: 7/26/23     Benitez Leal is a 20 y.o. female with CC: Exposure to STD and Vaginal Discharge (Concern for BV)      This visit was performed by both a physician and an APC. I performed all aspects of the MDM as documented. Based on the medical record the care appears appropriate. I was personally available for consultation in the Emergency Department.     The care is provided during an unprecedented national emergency due to the novel coronavirus, Aidan Rob MD  Attending Emergency Physician                  Franky Askew MD  07/26/23 [id number]

## 2023-07-27 NOTE — ED PROVIDER NOTES
[facility]  eMERGENCY dEPARTMENTeNCOUnter      Pt Name: Yassine Frias  MRN: [id number]  [address] [facility] 2000  Date ofevaluation: 7/26/2023  Provider: Sly Cuellar PA-C    CHIEF COMPLAINT       Chief Complaint   Patient presents with    Exposure to STD    Vaginal Discharge     Concern for BV         HISTORY OF PRESENT ILLNESS  (Location/Symptom, Timing/Onset, Context/Setting, Quality, Duration, Modifying Factors, Severity.)   Yassine Frias is a 20 y.o. female who presents to the emergency department with vaginal discharge and possible spoke to STD. No fevers or chills. Nausea vomiting. Reports being tested for BV roughly week ago but not treated due to disagreement with her medical provider at that point time. Nursing Notes were reviewed. ALLERGIES     Patient has no known allergies. CURRENT MEDICATIONS       Discharge Medication List as of 7/26/2023  4:01 PM        CONTINUE these medications which have NOT CHANGED    Details   ondansetron (ZOFRAN ODT) 4 MG disintegrating tablet Take 1 tablet by mouth every 8 hours as needed for Nausea, Disp-3 tablet, R-0Normal      acetaminophen (TYLENOL) 500 MG tablet Take 2 tablets by mouth 4 times daily as needed for Pain, Disp-40 tablet, R-0Print      ibuprofen (IBU) 600 MG tablet Take 1 tablet by mouth every 6 hours as needed for Pain, Disp-20 tablet, R-0Print      fluconazole (DIFLUCAN) 150 MG tablet Take 1 tablet by mouth every 72 hours, Disp-2 tablet, R-0Normal      dicyclomine (BENTYL) 10 MG capsule Take 1 capsule by mouth every 6 hours as needed (cramps), Disp-9 capsule, R-0Print      FLUoxetine (PROZAC) 10 MG capsule Take 1-2 capsules by mouth daily, Disp-60 capsule, R-0Normal      tretinoin (RETIN-A) 0.025 % gel Apply topically nightly., Disp-45 g, R-1, Print      benzoyl peroxide (BENZAC W WASH) 5 % external liquid Apply topically 2 times daily. , Disp-1 Bottle, R-1, Normal      medroxyPROGESTERone (DEPO-PROVERA) 150 MG/ML injection Inject 1

## 2024-05-31 ENCOUNTER — HOSPITAL ENCOUNTER (EMERGENCY)
Age: 24
Discharge: HOME | End: 2024-05-31
Attending: EMERGENCY MEDICINE
Payer: COMMERCIAL

## 2024-05-31 VITALS
DIASTOLIC BLOOD PRESSURE: 76 MMHG | WEIGHT: 150 LBS | TEMPERATURE: 98.1 F | RESPIRATION RATE: 18 BRPM | HEIGHT: 62 IN | OXYGEN SATURATION: 100 % | BODY MASS INDEX: 27.6 KG/M2 | HEART RATE: 70 BPM | SYSTOLIC BLOOD PRESSURE: 121 MMHG

## 2024-05-31 DIAGNOSIS — R10.30 LOWER ABDOMINAL PAIN: Primary | ICD-10-CM

## 2024-05-31 LAB
BACTERIA URNS QL MICRO: ABNORMAL
BILIRUB UR QL STRIP: NEGATIVE
CHP ED QC CHECK: NORMAL
CLARITY UR: ABNORMAL
COLOR UR: ABNORMAL
EPI CELLS #/AREA URNS HPF: ABNORMAL /HPF (ref 0–5)
GLUCOSE UR STRIP-MCNC: NEGATIVE MG/DL
HGB UR QL STRIP.AUTO: NEGATIVE
KETONES UR STRIP-MCNC: ABNORMAL MG/DL
LEUKOCYTE ESTERASE UR QL STRIP: ABNORMAL
MUCOUS THREADS URNS QL MICRO: ABNORMAL
NITRITE UR QL STRIP: NEGATIVE
PH UR STRIP: 6 [PH] (ref 5–8)
PREGNANCY TEST URINE, POC: NEGATIVE
PROT UR STRIP-MCNC: NEGATIVE MG/DL
RBC #/AREA URNS HPF: ABNORMAL /HPF (ref 0–2)
SP GR UR STRIP: 1.02 (ref 1–1.03)
UROBILINOGEN UR STRIP-ACNC: NORMAL EU/DL (ref 0–1)
WBC #/AREA URNS HPF: ABNORMAL /HPF (ref 0–5)

## 2024-05-31 PROCEDURE — 99283 EMERGENCY DEPT VISIT LOW MDM: CPT

## 2024-05-31 PROCEDURE — 81001 URINALYSIS AUTO W/SCOPE: CPT

## 2024-05-31 PROCEDURE — 6370000000 HC RX 637 (ALT 250 FOR IP)

## 2024-05-31 RX ORDER — ACETAMINOPHEN 325 MG/1
650 TABLET ORAL ONCE
Status: COMPLETED | OUTPATIENT
Start: 2024-05-31 | End: 2024-05-31

## 2024-05-31 RX ADMIN — ACETAMINOPHEN 650 MG: 325 TABLET ORAL at 16:20

## 2024-05-31 ASSESSMENT — PAIN SCALES - GENERAL
PAINLEVEL_OUTOF10: 8
PAINLEVEL_OUTOF10: 8

## 2024-05-31 ASSESSMENT — ENCOUNTER SYMPTOMS
SORE THROAT: 0
SHORTNESS OF BREATH: 0
ABDOMINAL PAIN: 1
RHINORRHEA: 0
DIARRHEA: 0
COUGH: 0
BACK PAIN: 0
NAUSEA: 0

## 2024-05-31 ASSESSMENT — PAIN DESCRIPTION - LOCATION: LOCATION: ABDOMEN;PELVIS

## 2024-05-31 ASSESSMENT — PAIN DESCRIPTION - DESCRIPTORS: DESCRIPTORS: CRAMPING

## 2024-05-31 ASSESSMENT — PAIN - FUNCTIONAL ASSESSMENT: PAIN_FUNCTIONAL_ASSESSMENT: 0-10

## 2024-05-31 NOTE — DISCHARGE INSTRUCTIONS
If start having worsening symptoms including any persistent nausea, vomiting, worsening abdominal pain, chest pain, shortness of breath please return to the ED to be evaluated further.

## 2024-05-31 NOTE — ED PROVIDER NOTES
[facility] ED  Emergency Department Encounter  Emergency Medicine Resident     Pt Name: Bisi Solorzano  MRN: [id number]  Birthdate 2000  Date of evaluation: 5/31/24  PCP:  No primary care provider on file.    CHIEF COMPLAINT       Chief Complaint   Patient presents with    Abdominal Pain     *has urine sample in room*    Pelvic Pain     X 1 week; like 'period cramping but it hasn't come'        HISTORY OFPRESENT ILLNESS  (Location/Symptom, Timing/Onset, Context/Setting, Quality, Duration, Modifying Factors,Severity.)      Bisi Solorzano is a 23 y.o. female with no significant past medical history who presents with abdominal pain that has been ongoing for 1 week.  Patient states that she has suprapubic pain that is most significant at nighttime, wakes up from the pain.  Stated that she feels \"that she could be pregnant\". Patient has no previous history of pregnancy. FDLMP was May 15, finished her period on May 21.  Patient states that she has been using condoms for birth control.  Is also endorsing increasing frequency for urination as well as some pain.  Denies any vaginal discharge, vaginal spotting.    In the ED patient is vitally stable.  On physical exam has some pain in the suprapubic region.  Denies any chest pain, shortness of breath, nausea, vomiting at this time.    PAST MEDICAL / SURGICAL / SOCIAL / FAMILY HISTORY      has no past medical history on file.     has no past surgical history on file.     Social:  reports that she has never smoked. She has been exposed to tobacco smoke. She has never used smokeless tobacco. She reports that she does not drink alcohol and does not use drugs.    Family Hx:   Family History   Problem Relation Age of Onset    Asthma Brother     Heart Disease Maternal Grandmother     High Blood Pressure Maternal Grandmother     Cancer Maternal Grandfather     High Blood Pressure Maternal Grandfather     High Cholesterol Maternal Grandfather     Learning Disabilities

## 2024-05-31 NOTE — ED NOTES
Pt presenting to the ED with complaints of pelvic pain. PT reports that this pain has been on going. PT is A&Ox4.

## 2024-06-01 NOTE — ED PROVIDER NOTES
EMERGENCY DEPARTMENT ENCOUNTER   ATTENDING ATTESTATION     Pt Name: Bisi Solorzano  MRN: [id number]  Birthdate 2000  Date of evaluation: 6/1/24       Bisi Solorzano is a 23 y.o. female who presents with Abdominal Pain (*has urine sample in room*) and Pelvic Pain (X 1 week; like 'period cramping but it hasn't come' )      MDM:   Pt presents to the ED for lower abd pain, states that she \"feels pregnant\". Pt well-appearing with stable vitals, no abd tenderness on exam. UA more consistent with contamination than infection, upreg negative. Pt given return precautions and discharged in stable condition.     Vitals:   Vitals:    05/31/24 1548   BP: 121/76   Pulse: 70   Resp: 18   Temp: 98.1 °F (36.7 °C)   SpO2: 100%   Weight: 68 kg (150 lb)   Height: 1.575 m (5' 2\")         I personally evaluated and examined the patient in conjunction with the resident and agree with the assessment, treatment plan, and disposition of the patient as recorded by the resident.    I performed a history and physical examination of the patient and discussed management with the resident. I reviewed the resident’s note and agree with the documented findings and plan of care. Any areas of disagreement are noted on the chart. I was personally present for the key portions of any procedures. I have documented in the chart those procedures where I was not present during the key portions. I have personally reviewed all images and agree with the resident's interpretation. I have reviewed the emergency nurses triage note. I agree with the chief complaint, past medical history, past surgical history, allergies, medications, social and family history as documented unless otherwise noted.    Angelia Ji MD  Attending Emergency Physician            Angelia Ji MD  06/01/24 0900

## 2024-12-24 ENCOUNTER — HOSPITAL ENCOUNTER (EMERGENCY)
Age: 24
Discharge: HOME | End: 2024-12-24
Attending: STUDENT IN AN ORGANIZED HEALTH CARE EDUCATION/TRAINING PROGRAM
Payer: OTHER MISCELLANEOUS

## 2024-12-24 VITALS
SYSTOLIC BLOOD PRESSURE: 117 MMHG | BODY MASS INDEX: 27.44 KG/M2 | DIASTOLIC BLOOD PRESSURE: 76 MMHG | RESPIRATION RATE: 15 BRPM | WEIGHT: 150 LBS | OXYGEN SATURATION: 100 % | TEMPERATURE: 98.8 F | HEART RATE: 80 BPM
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
## 2024-12-24 DIAGNOSIS — S09.90XA INJURY OF HEAD, INITIAL ENCOUNTER: ICD-10-CM

## 2024-12-24 PROCEDURE — 6370000000 HC RX 637 (ALT 250 FOR IP): Performed by: NURSE PRACTITIONER

## 2024-12-24 PROCEDURE — 99283 EMERGENCY DEPT VISIT LOW MDM: CPT

## 2024-12-24 RX ORDER — BUTALBITAL, ACETAMINOPHEN AND CAFFEINE 50; 325; 40 MG/1; MG/1; MG/1
1 TABLET ORAL EVERY 6 HOURS PRN
Qty: 8 TABLET | Refills: 0 | Status: SHIPPED | OUTPATIENT
Start: 2024-12-24

## 2024-12-24 RX ORDER — ACETAMINOPHEN 500 MG
1000 TABLET ORAL ONCE
Status: COMPLETED | OUTPATIENT
Start: 2024-12-24 | End: 2024-12-24

## 2024-12-24 RX ADMIN — ACETAMINOPHEN 1000 MG: 500 TABLET ORAL at 19:31

## 2024-12-24 ASSESSMENT — ENCOUNTER SYMPTOMS
BACK PAIN: 0
NAUSEA: 0
FACIAL SWELLING: 0
SORE THROAT: 0
SHORTNESS OF BREATH: 0
COLOR CHANGE: 0
PHOTOPHOBIA: 0
VOMITING: 0
EYE PAIN: 0
ABDOMINAL PAIN: 0
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
## 2024-12-25 NOTE — DISCHARGE INSTRUCTIONS
Call [phone] ([phone]) to establish care for follow up.  You can also call [employer] at [phone] to establish care.

## 2025-01-28 ENCOUNTER — TELEPHONE (OUTPATIENT)
Dept: FAMILY MEDICINE CLINIC | Age: 25
End: 2025-01-28

## 2025-01-28 NOTE — TELEPHONE ENCOUNTER
Patient returning missed call. Writer didn't seen any encounters. Writer informed could be the automatic system calling patient to inform of appt on 1-30-25. Patient confirmed appt.

## 2025-01-30 ENCOUNTER — OFFICE VISIT (OUTPATIENT)
Dept: FAMILY MEDICINE CLINIC | Age: 25
End: 2025-01-30
Payer: COMMERCIAL

## 2025-01-30 VITALS
DIASTOLIC BLOOD PRESSURE: 82 MMHG | BODY MASS INDEX: 25.28 KG/M2 | WEIGHT: 137.4 LBS | HEIGHT: 62 IN | SYSTOLIC BLOOD PRESSURE: 122 MMHG

## 2025-01-30 DIAGNOSIS — B96.89 BACTERIAL VAGINOSIS: Primary | ICD-10-CM

## 2025-01-30 DIAGNOSIS — G89.29 CHRONIC MIDLINE LOW BACK PAIN WITHOUT SCIATICA: ICD-10-CM

## 2025-01-30 DIAGNOSIS — N76.0 BACTERIAL VAGINOSIS: Primary | ICD-10-CM

## 2025-01-30 DIAGNOSIS — M54.50 CHRONIC MIDLINE LOW BACK PAIN WITHOUT SCIATICA: ICD-10-CM

## 2025-01-30 LAB
BILIRUBIN, POC: NORMAL
BLOOD URINE, POC: NORMAL
CLARITY, POC: NORMAL
COLOR, POC: NORMAL
CONTROL: YES
GLUCOSE URINE, POC: NEGATIVE MG/DL
KETONES, POC: NEGATIVE MG/DL
LEUKOCYTE EST, POC: NEGATIVE
NITRITE, POC: NEGATIVE
PH, POC: 6
PREGNANCY TEST URINE, POC: NEGATIVE
PROTEIN, POC: 30 MG/DL
SPECIFIC GRAVITY, POC: 1.03
UROBILINOGEN, POC: 0.2 MG/DL

## 2025-01-30 PROCEDURE — 99203 OFFICE O/P NEW LOW 30 MIN: CPT

## 2025-01-30 PROCEDURE — 81025 URINE PREGNANCY TEST: CPT

## 2025-01-30 PROCEDURE — 81003 URINALYSIS AUTO W/O SCOPE: CPT

## 2025-01-30 SDOH — ECONOMIC STABILITY: FOOD INSECURITY: WITHIN THE PAST 12 MONTHS, THE FOOD YOU BOUGHT JUST DIDN'T LAST AND YOU DIDN'T HAVE MONEY TO GET MORE.: NEVER TRUE

## 2025-01-30 SDOH — ECONOMIC STABILITY: FOOD INSECURITY: WITHIN THE PAST 12 MONTHS, YOU WORRIED THAT YOUR FOOD WOULD RUN OUT BEFORE YOU GOT MONEY TO BUY MORE.: NEVER TRUE

## 2025-01-30 ASSESSMENT — PATIENT HEALTH QUESTIONNAIRE - PHQ9
2. FEELING DOWN, DEPRESSED OR HOPELESS: NOT AT ALL
SUM OF ALL RESPONSES TO PHQ QUESTIONS 1-9: 0
1. LITTLE INTEREST OR PLEASURE IN DOING THINGS: NOT AT ALL
SUM OF ALL RESPONSES TO PHQ9 QUESTIONS 1 & 2: 0
SUM OF ALL RESPONSES TO PHQ QUESTIONS 1-9: 0

## 2025-01-30 NOTE — PATIENT INSTRUCTIONS
Thank you for letting us take care of you today. We hope all your questions were addressed. If a question was overlooked or something else comes to mind after you return home, please contact a member of your Care Team listed below.      Your Care Team at [facility] is Team #  Hipolito Garcia M.D. (Faculty)  Eliezer Leach M.D. (Resident)  Scott Spicer M.D. (Resident)   Sandra Pat M.D. (Resident)  Bird Ariza M.D. (Resident)  Fawn Alvarez M.D. (Resident)  Nickie Meza., [facility]  Marielena Samuels, [facility]  Sharlene Greene, [facility]  Mick Alexandra, [facility]  Nikki Das, [facility]  Carolyn Rae, [facility]  Loly Looney (LJ) [facility] (Clinical Practice Manager)  Varsha Espinoza [facility] (Clinical Pharmacist)     Office phone number: [phone]    If you need to get in right away due to illness, please be advised we have \"Same Day\" appointments available Monday-Friday. Please call us at [phone] option #3 to schedule your \"Same Day\" appointment.

## 2025-01-31 ENCOUNTER — HOSPITAL ENCOUNTER (OUTPATIENT)
Age: 25
Setting detail: SPECIMEN
Discharge: HOME | End: 2025-01-31

## 2025-01-31 DIAGNOSIS — B96.89 BACTERIAL VAGINOSIS: ICD-10-CM

## 2025-01-31 DIAGNOSIS — N76.0 BACTERIAL VAGINOSIS: ICD-10-CM

## 2025-01-31 LAB
CANDIDA SPECIES: NEGATIVE
GARDNERELLA VAGINALIS: POSITIVE
N GONORRHOEA DNA SPEC QL PROBE+SIG AMP: NORMAL
SOURCE: ABNORMAL
SPECIMEN DESCRIPTION: NORMAL
TRICHOMONAS: POSITIVE

## 2025-02-03 ENCOUNTER — TELEPHONE (OUTPATIENT)
Dept: FAMILY MEDICINE CLINIC | Age: 25
End: 2025-02-03

## 2025-02-03 DIAGNOSIS — B96.89 BACTERIAL VAGINOSIS: Primary | ICD-10-CM

## 2025-02-03 DIAGNOSIS — N76.0 BACTERIAL VAGINOSIS: Primary | ICD-10-CM

## 2025-02-03 DIAGNOSIS — A59.01 TRICHOMONAS VAGINALIS (TV) INFECTION: ICD-10-CM

## 2025-02-03 PROBLEM — D75.838 REACTIVE THROMBOCYTOSIS: Status: RESOLVED | Noted: 2020-08-28 | Resolved: 2025-02-03

## 2025-02-03 PROBLEM — L03.119 RECURRENT CELLULITIS OF THIGH: Status: RESOLVED | Noted: 2020-08-28 | Resolved: 2025-02-03

## 2025-02-03 PROBLEM — L02.419 ABSCESS OF THIGH: Status: RESOLVED | Noted: 2020-08-28 | Resolved: 2025-02-03

## 2025-02-03 PROBLEM — G89.29 CHRONIC MIDLINE LOW BACK PAIN WITHOUT SCIATICA: Status: ACTIVE | Noted: 2025-02-03

## 2025-02-03 PROBLEM — M54.50 CHRONIC MIDLINE LOW BACK PAIN WITHOUT SCIATICA: Status: ACTIVE | Noted: 2025-02-03

## 2025-02-03 PROBLEM — L03.90 MRSA CELLULITIS: Status: RESOLVED | Noted: 2020-08-29 | Resolved: 2025-02-03

## 2025-02-03 PROBLEM — R79.82 CRP ELEVATED: Status: RESOLVED | Noted: 2020-08-28 | Resolved: 2025-02-03

## 2025-02-03 PROBLEM — B95.62 MRSA CELLULITIS: Status: RESOLVED | Noted: 2020-08-29 | Resolved: 2025-02-03

## 2025-02-03 LAB
N GONORRHOEA DNA SPEC QL PROBE+SIG AMP: NEGATIVE
SPECIMEN DESCRIPTION: NORMAL

## 2025-02-03 RX ORDER — METRONIDAZOLE 500 MG/1
500 TABLET ORAL 2 TIMES DAILY
Qty: 14 TABLET | Refills: 0 | Status: SHIPPED | OUTPATIENT
Start: 2025-02-03 | End: 2025-02-10

## 2025-02-03 NOTE — PROGRESS NOTES
24-year-old male was seen in office last Thursday, patient test came back positive for bacterial vaginosis then take a trichomonas.  Metronidazole 500 mg twice daily for 7 days has been sent to her pharmacy  Will call patient notify her also, patient partners need to be treated as well.      cSott Spicer,  PGY2 FM Resident

## 2025-02-03 NOTE — TELEPHONE ENCOUNTER
Patient has called back wanting you to look at her result and send her medication to her pharmacy so she can pick them up.

## 2025-02-04 DIAGNOSIS — B96.89 BACTERIAL VAGINOSIS: ICD-10-CM

## 2025-02-04 DIAGNOSIS — A59.01 TRICHOMONAS VAGINALIS (TV) INFECTION: ICD-10-CM

## 2025-02-04 DIAGNOSIS — N76.0 BACTERIAL VAGINOSIS: ICD-10-CM

## 2025-02-04 RX ORDER — METRONIDAZOLE 500 MG/1
500 TABLET ORAL 2 TIMES DAILY
Qty: 14 TABLET | Refills: 0 | Status: CANCELLED | OUTPATIENT
Start: 2025-02-04 | End: 2025-02-11

## 2025-02-04 NOTE — TELEPHONE ENCOUNTER
Patient called stating the medication that was sent for her needed to be a cream and not the pills Patient states she has a hard time swallowing.  Please advise and thank you

## 2025-02-04 NOTE — TELEPHONE ENCOUNTER
Patient called office stating the medication Flagyl that was sent to her pharmacy is causing her to vomit. She is throwing up the entire pill. Patient said the pill is not going to work. Please advise or call patient.

## 2025-02-05 ENCOUNTER — TELEPHONE (OUTPATIENT)
Dept: FAMILY MEDICINE CLINIC | Age: 25
End: 2025-02-05

## 2025-02-05 NOTE — TELEPHONE ENCOUNTER
Patient called in to office and stated if you can send her some medication in for nausea due to the medication Flagyl she is taking is making her nausea and vomit.

## 2025-02-07 DIAGNOSIS — R11.0 NAUSEA: Primary | ICD-10-CM

## 2025-02-07 RX ORDER — ONDANSETRON 4 MG/1
4 TABLET, FILM COATED ORAL 3 TIMES DAILY PRN
Qty: 15 TABLET | Refills: 0 | Status: SHIPPED | OUTPATIENT
Start: 2025-02-07

## 2025-02-10 NOTE — PROGRESS NOTES
Attending Physician Statement  I have discussed the care of Bisi Solorzano, 24 y.o. female,including pertinent history and exam findings,  with the resident Scott Perez MD.  History:  Chief Complaint   Patient presents with    Vaginal Discharge    Back Pain       I have reviewed the key elements of the encounter with the resident. Examination was done by resident as documented in residents note.    BP Readings from Last 3 Encounters:   01/30/25 122/82   12/24/24 117/76   05/31/24 121/76     /82 (Site: Left Upper Arm, Position: Sitting, Cuff Size: Medium Adult)   Ht 1.575 m (5' 2\")   Wt 62.3 kg (137 lb 6.4 oz)   BMI 25.13 kg/m²   Lab Results   Component Value Date    WBC 9.8 08/29/2020    HGB 11.2 (L) 08/29/2020    HCT 36.4 08/29/2020     (H) 08/29/2020     08/31/2020    K 3.8 08/31/2020     08/31/2020    CREATININE 0.57 08/31/2020    BUN 4 (L) 08/31/2020    CO2 20 08/31/2020    TSH 1.12 03/01/2019     Lab Results   Component Value Date    CALCIUM 9.5 08/31/2020     No results found for: \"LDLDIRECT\"  I agree with the assessment, plan and diagnosis of    Diagnosis Orders   1. Bacterial vaginosis  Vaginitis DNA Probe    POCT Urinalysis No Micro (Auto)    POCT urine pregnancy      2. Chronic midline low back pain without sciatica          I agree with orders as documented by the resident.    Recommendations:     Return in about 6 weeks (around 3/13/2025) for Back pain.   (GE Modifier ) Dr. MARIBEL CRENSHAW MD  
Visit Information    Have you changed or started any medications since your last visit including any over-the-counter medicines, vitamins, or herbal medicines? no   Have you stopped taking any of your medications? Is so, why? -  no  Are you having any side effects from any of your medications? - no    Have you seen any other physician or provider since your last visit?  no   Have you had any other diagnostic tests since your last visit?  no  Have you been seen in the emergency room and/or had an admission in a hospital since we last saw you?  yes -  [facility]   Have you had your routine dental cleaning in the past 6 months?  no     Do you have an active MyChart account? If no, what is the barrier?  Yes    No care team member to display    Medical History Review  Past Medical, Family, and Social History reviewed and does contribute to the patient presenting condition    Health Maintenance   Topic Date Due    Depression Screen  Never done    Varicella vaccine (2 of 2 - 2-dose childhood series) 03/13/2012    Hepatitis B vaccine (1 of 3 - 19+ 3-dose series) Never done    Pap smear  Never done    Flu vaccine (1) 08/01/2024    COVID-19 Vaccine (2 - 2023-24 season) 09/01/2024    DTaP/Tdap/Td vaccine (3 - Td or Tdap) 08/28/2030    Hepatitis A vaccine  Completed    HPV vaccine  Completed    Meningococcal (ACWY) vaccine  Completed    Hepatitis C screen  Completed    HIV screen  Completed    Hib vaccine  Aged Out    Polio vaccine  Aged Out    Pneumococcal 0-64 years Vaccine  Aged Out    Chlamydia/GC screen  Discontinued               
Arthritis Neg Hx     Birth Defects Neg Hx     Depression Neg Hx     Diabetes Neg Hx     Early Death Neg Hx     Hearing Loss Neg Hx     Kidney Disease Neg Hx     Mental Illness Neg Hx     Mental Retardation Neg Hx     Miscarriages / Stillbirths Neg Hx        Objective:    /82 (Site: Left Upper Arm, Position: Sitting, Cuff Size: Medium Adult)   Ht 1.575 m (5' 2\")   Wt 62.3 kg (137 lb 6.4 oz)   BMI 25.13 kg/m²    BP Readings from Last 3 Encounters:   01/30/25 122/82   12/24/24 117/76   05/31/24 121/76       Physical Exam  Exam conducted with a chaperone present.   Constitutional:       Appearance: Normal appearance.   Cardiovascular:      Rate and Rhythm: Normal rate and regular rhythm.   Pulmonary:      Effort: Pulmonary effort is normal.      Breath sounds: Normal breath sounds. No wheezing.   Genitourinary:     Exam position: Lithotomy position.      Pubic Area: No rash or pubic lice.       Baljeet stage (genital): 5.      Labia:         Right: No tenderness or lesion.       Urethra: No prolapse or urethral swelling.      Vagina: Vaginal discharge present. No tenderness, bleeding or prolapsed vaginal walls.      Cervix: No erythema.   Musculoskeletal:      Cervical back: No deformity, erythema, lacerations, tenderness or bony tenderness.      Lumbar back: No deformity, lacerations, tenderness or bony tenderness. Negative right straight leg raise test.   Neurological:      Mental Status: She is alert.         Lab Results   Component Value Date    WBC 9.8 08/29/2020    HGB 11.2 (L) 08/29/2020    HCT 36.4 08/29/2020     (H) 08/29/2020     08/31/2020    K 3.8 08/31/2020     08/31/2020    CREATININE 0.57 08/31/2020    BUN 4 (L) 08/31/2020    CO2 20 08/31/2020    TSH 1.12 03/01/2019     Lab Results   Component Value Date    CALCIUM 9.5 08/31/2020     No results found for: \"LDLDIRECT\"    Assessment and Plan:    1. Bacterial vaginosis  -No records from urgent care found, however last test positive

## 2025-08-26 ENCOUNTER — OFFICE VISIT (OUTPATIENT)
Age: 25
End: 2025-08-26
Payer: COMMERCIAL

## 2025-08-26 ENCOUNTER — HOSPITAL ENCOUNTER (OUTPATIENT)
Age: 25
Setting detail: SPECIMEN
Discharge: HOME | End: 2025-08-26

## 2025-08-26 VITALS
SYSTOLIC BLOOD PRESSURE: 110 MMHG | WEIGHT: 144.4 LBS | BODY MASS INDEX: 26.57 KG/M2 | DIASTOLIC BLOOD PRESSURE: 64 MMHG | HEIGHT: 62 IN

## 2025-08-26 DIAGNOSIS — N89.8 VAGINAL DISCHARGE: ICD-10-CM

## 2025-08-26 DIAGNOSIS — L90.5 ACNE SCAR: Primary | ICD-10-CM

## 2025-08-26 LAB
CANDIDA SPECIES: NEGATIVE
GARDNERELLA VAGINALIS: POSITIVE
SOURCE: ABNORMAL
TRICHOMONAS: NEGATIVE

## 2025-08-26 PROCEDURE — 99213 OFFICE O/P EST LOW 20 MIN: CPT

## 2025-08-26 RX ORDER — FLUCONAZOLE 150 MG/1
150 TABLET ORAL ONCE
Qty: 1 TABLET | Refills: 0 | Status: SHIPPED | OUTPATIENT
Start: 2025-08-26 | End: 2025-08-26

## 2025-08-26 ASSESSMENT — PATIENT HEALTH QUESTIONNAIRE - PHQ9
SUM OF ALL RESPONSES TO PHQ QUESTIONS 1-9: 0
SUM OF ALL RESPONSES TO PHQ QUESTIONS 1-9: 0
1. LITTLE INTEREST OR PLEASURE IN DOING THINGS: NOT AT ALL
SUM OF ALL RESPONSES TO PHQ QUESTIONS 1-9: 0
SUM OF ALL RESPONSES TO PHQ QUESTIONS 1-9: 0
2. FEELING DOWN, DEPRESSED OR HOPELESS: NOT AT ALL

## 2025-08-28 ENCOUNTER — TELEPHONE (OUTPATIENT)
Age: 25
End: 2025-08-28

## 2025-08-28 DIAGNOSIS — N76.0 BACTERIAL VAGINOSIS: Primary | ICD-10-CM

## 2025-08-28 DIAGNOSIS — B96.89 BACTERIAL VAGINOSIS: Primary | ICD-10-CM

## 2025-08-28 RX ORDER — METRONIDAZOLE 7.5 MG/G
GEL VAGINAL
Qty: 25 G | Refills: 0 | Status: SHIPPED | OUTPATIENT
Start: 2025-08-28 | End: 2025-09-04